# Patient Record
Sex: FEMALE | Race: WHITE | HISPANIC OR LATINO | Employment: FULL TIME | URBAN - METROPOLITAN AREA
[De-identification: names, ages, dates, MRNs, and addresses within clinical notes are randomized per-mention and may not be internally consistent; named-entity substitution may affect disease eponyms.]

---

## 2020-09-10 PROBLEM — M65.4 DE QUERVAIN'S TENOSYNOVITIS, LEFT: Status: ACTIVE | Noted: 2020-09-10

## 2020-09-10 PROBLEM — M65.311 TRIGGER THUMB OF RIGHT HAND: Status: ACTIVE | Noted: 2020-09-10

## 2020-11-03 ENCOUNTER — TELEPHONE (OUTPATIENT)
Dept: PAIN MEDICINE | Facility: CLINIC | Age: 60
End: 2020-11-03

## 2020-11-03 NOTE — TELEPHONE ENCOUNTER
----- Message from Kurtis Da Silva sent at 11/3/2020 11:07 AM CST -----  Regarding: VA referral  Contact: VA referral  VA referral/records and auth scanned into       CC: SCS battery swap and cervical rfa      Pt can be reached at [phone] (home)

## 2020-12-04 ENCOUNTER — HOSPITAL ENCOUNTER (OUTPATIENT)
Dept: RADIOLOGY | Facility: HOSPITAL | Age: 60
Discharge: HOME | End: 2020-12-04
Attending: ANESTHESIOLOGY
Payer: OTHER GOVERNMENT

## 2020-12-04 ENCOUNTER — HOSPITAL ENCOUNTER (OUTPATIENT)
Dept: RADIOLOGY | Facility: HOSPITAL | Age: 60
Discharge: HOME | End: 2020-12-04
Attending: ANESTHESIOLOGY
Payer: COMMERCIAL

## 2020-12-04 ENCOUNTER — OFFICE VISIT (OUTPATIENT)
Dept: PAIN MEDICINE | Facility: CLINIC | Age: 60
End: 2020-12-04
Payer: OTHER GOVERNMENT

## 2020-12-04 VITALS
WEIGHT: 179.13 LBS | HEART RATE: 80 BPM | HEIGHT: 66 IN | SYSTOLIC BLOOD PRESSURE: 148 MMHG | TEMPERATURE: 98 F | OXYGEN SATURATION: 97 % | DIASTOLIC BLOOD PRESSURE: 72 MMHG | RESPIRATION RATE: 18 BRPM | BODY MASS INDEX: 28.79 KG/M2

## 2020-12-04 DIAGNOSIS — M54.16 BILATERAL LUMBAR RADICULOPATHY: ICD-10-CM

## 2020-12-04 DIAGNOSIS — M50.30 DDD (DEGENERATIVE DISC DISEASE), CERVICAL: ICD-10-CM

## 2020-12-04 DIAGNOSIS — G89.4 CHRONIC PAIN DISORDER: Primary | ICD-10-CM

## 2020-12-04 DIAGNOSIS — M43.00 SPONDYLOLYSIS, SITE UNSPECIFIED: ICD-10-CM

## 2020-12-04 DIAGNOSIS — M47.816 LUMBAR SPONDYLOSIS: ICD-10-CM

## 2020-12-04 PROCEDURE — 99205 OFFICE O/P NEW HI 60 MIN: CPT | Mod: S$PBB,,, | Performed by: ANESTHESIOLOGY

## 2020-12-04 PROCEDURE — 99205 PR OFFICE/OUTPT VISIT, NEW, LEVL V, 60-74 MIN: ICD-10-PCS | Mod: S$PBB,,, | Performed by: ANESTHESIOLOGY

## 2020-12-04 PROCEDURE — 72080 XR THORACOLUMBAR SPINE AP LATERAL: ICD-10-PCS | Mod: 26,,, | Performed by: RADIOLOGY

## 2020-12-04 PROCEDURE — 72050 XR CERVICAL SPINE COMPLETE 5 VIEW: ICD-10-PCS | Mod: 26,,, | Performed by: RADIOLOGY

## 2020-12-04 PROCEDURE — 72080 X-RAY EXAM THORACOLMB 2/> VW: CPT | Mod: 26,,, | Performed by: RADIOLOGY

## 2020-12-04 PROCEDURE — 72050 X-RAY EXAM NECK SPINE 4/5VWS: CPT | Mod: TC,PO

## 2020-12-04 PROCEDURE — 72080 X-RAY EXAM THORACOLMB 2/> VW: CPT | Mod: TC,PO

## 2020-12-04 PROCEDURE — 99999 PR PBB SHADOW E&M-EST. PATIENT-LVL IV: CPT | Mod: PBBFAC,,, | Performed by: ANESTHESIOLOGY

## 2020-12-04 PROCEDURE — 99214 OFFICE O/P EST MOD 30 MIN: CPT | Mod: PBBFAC,25,PN | Performed by: ANESTHESIOLOGY

## 2020-12-04 PROCEDURE — 72050 X-RAY EXAM NECK SPINE 4/5VWS: CPT | Mod: 26,,, | Performed by: RADIOLOGY

## 2020-12-04 PROCEDURE — 99999 PR PBB SHADOW E&M-EST. PATIENT-LVL IV: ICD-10-PCS | Mod: PBBFAC,,, | Performed by: ANESTHESIOLOGY

## 2020-12-04 NOTE — PROGRESS NOTES
This note was completed with dictation software and grammatical errors may exist.    CC:Neck pain, back and leg pain    HPI:  The patient is a 60-year-old woman with a history of chronic cervical and lumbar DDD issues, status post cervical fusion and spinal cord stimulator for lumbar issues who presents in self-referral for continued neck and back pain.  The patient has been in the [employer] for many years and so has had her care through the [facility].  She reports that she was traveling to [place] for [facility] care and would rather stay on this side of the leg.  She reports having chronic neck issues, developed weakness and eventually underwent cervical fusion in 2008.  She did very well with this, resolved her neck and left arm pain but over the last 10 years she has had more neck pain radiating into left arm.  She has undergone cervical epidural steroid injections and radiofrequency ablation is a with some relief.  The last time this was done was about a year ago.  She feels that these provide significant benefit so that she can function well in her continued work.  Her other problem is chronic low back pain, had undergone numerous interventions for this, never had any lumbar surgery, was told that this would be too extensive.  She eventually underwent spinal cord stimulator implantation in 2015 with [employer].  She reports that this has helped tremendously in her bilateral low back and legs.  However, over the last several years she has not had as much relief and states that the [facility] had not reprogrammed the device but instead told her that they would change out the IPG.  She denies any weakness in the arms or legs, states that her pain is fairly constant.  It is worse with prolonged sitting or standing, worse in the morning, worse with doing any lifting.  She gets some relief with rest, heat and cold and medications.    Pain intervention history:  She has undergone cervical radiofrequency ablation,  unclear what level but states that she gets up to 85% relief for up to 9 months with this.  She has had a [employer] stimulator placed in 2015, it is not MRI compatible.    Antineuropathics:  Gabapentin 600 milligrams 3 times daily  NSAIDs:  Physical therapy:  Last physical therapy was in 2019, no benefit.  Walks for exercise  Antidepressants:  Cymbalta 60 milligrams  Muscle relaxers:  Opioids:  Tramadol 50 milligrams 3 times daily with mild relief, rarely takes Percocet half tablet 7.5/325  Antiplatelets/Anticoagulants:        ROS:  She reports easy bruising, difficulty sleeping, anxiety and depression.  Balance of review of systems is negative.    No results found for: LABA1C, HGBA1C    Lab Results   Component Value Date    WBC 9.58 08/20/2020    HGB 13.2 08/20/2020    HCT 38.2 08/20/2020    MCV 96 08/20/2020     (H) 08/20/2020             Past Medical History:   Diagnosis Date    Hypertension     Insomnia     Restless leg syndrome        Past Surgical History:   Procedure Laterality Date    ACDF  2008    SPINAL CORD STIMULATOR IMPLANT         Social History     Socioeconomic History    Marital status:      Spouse name: Not on file    Number of children: Not on file    Years of education: Not on file    Highest education level: Not on file   Occupational History    Not on file   Social Needs    Financial resource strain: Not on file    Food insecurity     Worry: Not on file     Inability: Not on file    Transportation needs     Medical: Not on file     Non-medical: Not on file   Tobacco Use    Smoking status: Never Smoker    Smokeless tobacco: Never Used   Substance and Sexual Activity    Alcohol use: Not Currently    Drug use: Not on file    Sexual activity: Not on file   Lifestyle    Physical activity     Days per week: Not on file     Minutes per session: Not on file    Stress: Not on file   Relationships    Social connections     Talks on phone: Not on file     Gets together:  "Not on file     Attends Protestant service: Not on file     Active member of club or organization: Not on file     Attends meetings of clubs or organizations: Not on file     Relationship status: Not on file   Other Topics Concern    Not on file   Social History Narrative    Not on file         Medications/Allergies: See med card    Vitals:    12/04/20 0810   BP: (!) 148/72   Pulse: 80   Resp: 18   Temp: 98.2 °F (36.8 °C)   TempSrc: Temporal   SpO2: 97%   Weight: 81.3 kg (179 lb 2 oz)   Height: 5' 6" (1.676 m)   PainSc:   6   PainLoc: Back         Physical exam:  Gen: A and O x3, pleasant, well-groomed  Skin: No rashes or obvious lesions  HEENT: PERRLA, no obvious deformities on ears or in canals.Trachea midline.  CVS: Regular rate and rhythm, normal palpable pulses.  Resp: Clear to auscultation bilaterally, no wheezes or rales.  Abdomen: Soft, NT/ND.  Musculoskeletal: Able to heel walk, toe walk. No antalgic gait.     Neuro:  Upper extremities: 5/5 strength bilaterally   Reflexes: Brachioradialis 1+, Bicep 1+, Tricep 1+.   Sensory: Intact and symmetrical to light touch and pinprick in C2-T1 dermatomes bilaterally.    Cervical Spine:  Cervical spine:  Range of motion is mildly reduced with both flexion and extension with slight increased pain in the left neck with extension, especially with lateral rotation to either side with increased pain with left lateral rotation but only mildly reduced range of motion to the right or left.  Spurling's maneuver causes left neck pain.  Myofascial exam:  Mild tenderness palpation over the left cervical paraspinous region.    Neuro:  Lower extremities: 5/5 strength bilaterally  Reflexes: Patellar 1+, Achilles 1+ bilaterally.  Sensory:  Intact and symmetrical to light touch and pinprick in L2-S1 dermatomes bilaterally.    Lumbar spine:  Lumbar spine: ROM is full with flexion extension and oblique extension with increased pain in the midline low back with extension especially with " right greater than left lateral rotation.    Meño's test causes low back pain, no buttock pain, no pain with palpation over the SI joints  Supine straight leg raise is negative bilaterally.    Internal and external rotation of the hip causes no increased pain on either side.  Myofascial exam: No tenderness to palpation across lumbar paraspinous muscles.    Imaging:  None    Assessment:   The patient is a 60-year-old woman with a history of chronic cervical and lumbar DDD issues, status post cervical fusion and spinal cord stimulator for lumbar issues who presents in self-referral for continued neck and back pain.   1. Chronic pain disorder     2. Spondylolysis, site unspecified  X-Ray Thoracolumbar Spine AP Lateral    X-Ray Cervical Spine Complete 5 view   3. DDD (degenerative disc disease), cervical  X-Ray Cervical Spine Complete 5 view   4. Lumbar spondylosis  X-Ray Thoracolumbar Spine AP Lateral   5. Bilateral lumbar radiculopathy         Plan:  1.  For her neck pain, she has had cervical RFA ease in the past with very good relief of neck pain and left arm pain.  I am going to get records from the [facility] to see what had been done, once we receive these we can schedule her for a repeat RFA.  We will also get records of all of her imaging.  2.  For her low back pain, I think that if she has some reprogramming performed, she may get significant enough benefit that she does not have to have her IPG replaced.  I have contacted the representative from [employer] to contact her.  I will also get x-rays of her lumbar spine, thoracic spine and cervical spine to evaluate hardware, placement of thoracic spinal cord stimulator leads.      Greater than 50% of this 60min visit was spent educating and counseling this patient.

## 2020-12-15 ENCOUNTER — TELEPHONE (OUTPATIENT)
Dept: PAIN MEDICINE | Facility: CLINIC | Age: 60
End: 2020-12-15

## 2020-12-15 NOTE — TELEPHONE ENCOUNTER
----- Message from [facility] sent at 12/15/2020 12:27 PM CST -----  Clare from the [facility] in [place] would like to send a disk to the office for pt please reach out to them on how to do at [phone]

## 2021-01-12 ENCOUNTER — TELEPHONE (OUTPATIENT)
Dept: PAIN MEDICINE | Facility: CLINIC | Age: 61
End: 2021-01-12

## 2021-01-21 ENCOUNTER — TELEPHONE (OUTPATIENT)
Dept: PAIN MEDICINE | Facility: CLINIC | Age: 61
End: 2021-01-21

## 2021-01-22 DIAGNOSIS — Z13.9 SCREENING PROCEDURE: ICD-10-CM

## 2021-01-22 DIAGNOSIS — M47.812 CERVICAL SPONDYLOSIS: Primary | ICD-10-CM

## 2021-02-03 ENCOUNTER — TELEPHONE (OUTPATIENT)
Dept: PAIN MEDICINE | Facility: CLINIC | Age: 61
End: 2021-02-03

## 2021-02-23 ENCOUNTER — TELEPHONE (OUTPATIENT)
Dept: PAIN MEDICINE | Facility: CLINIC | Age: 61
End: 2021-02-23

## 2021-03-08 ENCOUNTER — LAB VISIT (OUTPATIENT)
Dept: FAMILY MEDICINE | Facility: CLINIC | Age: 61
End: 2021-03-08
Payer: OTHER GOVERNMENT

## 2021-03-08 DIAGNOSIS — Z13.9 SCREENING PROCEDURE: ICD-10-CM

## 2021-03-08 PROCEDURE — U0005 INFEC AGEN DETEC AMPLI PROBE: HCPCS | Performed by: ANESTHESIOLOGY

## 2021-03-08 PROCEDURE — U0003 INFECTIOUS AGENT DETECTION BY NUCLEIC ACID (DNA OR RNA); SEVERE ACUTE RESPIRATORY SYNDROME CORONAVIRUS 2 (SARS-COV-2) (CORONAVIRUS DISEASE [COVID-19]), AMPLIFIED PROBE TECHNIQUE, MAKING USE OF HIGH THROUGHPUT TECHNOLOGIES AS DESCRIBED BY CMS-2020-01-R: HCPCS | Performed by: ANESTHESIOLOGY

## 2021-03-09 LAB — SARS-COV-2 RNA RESP QL NAA+PROBE: NOT DETECTED

## 2021-03-11 ENCOUNTER — HOSPITAL ENCOUNTER (OUTPATIENT)
Facility: HOSPITAL | Age: 61
Discharge: HOME | End: 2021-03-11
Attending: ANESTHESIOLOGY | Admitting: ANESTHESIOLOGY
Payer: OTHER GOVERNMENT

## 2021-03-11 ENCOUNTER — HOSPITAL ENCOUNTER (OUTPATIENT)
Dept: RADIOLOGY | Facility: HOSPITAL | Age: 61
Discharge: HOME | End: 2021-03-11
Attending: ANESTHESIOLOGY
Payer: OTHER GOVERNMENT

## 2021-03-11 DIAGNOSIS — G89.4 CHRONIC PAIN DISORDER: ICD-10-CM

## 2021-03-11 DIAGNOSIS — M47.812 CERVICAL SPONDYLOSIS: Primary | ICD-10-CM

## 2021-03-11 PROCEDURE — 25000003 PHARM REV CODE 250: Mod: PO | Performed by: ANESTHESIOLOGY

## 2021-03-11 PROCEDURE — 64634 DESTROY C/TH FACET JNT ADDL: CPT | Mod: LT,,, | Performed by: ANESTHESIOLOGY

## 2021-03-11 PROCEDURE — 64633 DESTROY CERV/THOR FACET JNT: CPT | Mod: PO | Performed by: ANESTHESIOLOGY

## 2021-03-11 PROCEDURE — 64634 PR DESTROY C/TH FACET JNT ADDL: ICD-10-PCS | Mod: LT,,, | Performed by: ANESTHESIOLOGY

## 2021-03-11 PROCEDURE — 76000 FLUOROSCOPY <1 HR PHYS/QHP: CPT | Mod: TC,PO

## 2021-03-11 PROCEDURE — 64633 PR DESTROY CERV/THOR FACET JNT: ICD-10-PCS | Mod: LT,,, | Performed by: ANESTHESIOLOGY

## 2021-03-11 PROCEDURE — 64634 DESTROY C/TH FACET JNT ADDL: CPT | Mod: PO | Performed by: ANESTHESIOLOGY

## 2021-03-11 PROCEDURE — 63600175 PHARM REV CODE 636 W HCPCS: Mod: PO | Performed by: ANESTHESIOLOGY

## 2021-03-11 PROCEDURE — 99152 MOD SED SAME PHYS/QHP 5/>YRS: CPT | Mod: ,,, | Performed by: ANESTHESIOLOGY

## 2021-03-11 PROCEDURE — 99152 PR MOD CONSCIOUS SEDATION, SAME PHYS, 5+ YRS, FIRST 15 MIN: ICD-10-PCS | Mod: ,,, | Performed by: ANESTHESIOLOGY

## 2021-03-11 PROCEDURE — A4216 STERILE WATER/SALINE, 10 ML: HCPCS | Mod: PO | Performed by: ANESTHESIOLOGY

## 2021-03-11 PROCEDURE — 64633 DESTROY CERV/THOR FACET JNT: CPT | Mod: LT,,, | Performed by: ANESTHESIOLOGY

## 2021-03-11 RX ORDER — MIDAZOLAM HYDROCHLORIDE 2 MG/2ML
INJECTION, SOLUTION INTRAMUSCULAR; INTRAVENOUS
Status: DISCONTINUED | OUTPATIENT
Start: 2021-03-11 | End: 2021-03-11 | Stop reason: HOSPADM

## 2021-03-11 RX ORDER — FENTANYL CITRATE 50 UG/ML
INJECTION, SOLUTION INTRAMUSCULAR; INTRAVENOUS
Status: DISCONTINUED | OUTPATIENT
Start: 2021-03-11 | End: 2021-03-11 | Stop reason: HOSPADM

## 2021-03-11 RX ORDER — LIDOCAINE HYDROCHLORIDE 10 MG/ML
INJECTION, SOLUTION EPIDURAL; INFILTRATION; INTRACAUDAL; PERINEURAL
Status: DISCONTINUED | OUTPATIENT
Start: 2021-03-11 | End: 2021-03-11 | Stop reason: HOSPADM

## 2021-03-11 RX ORDER — SODIUM CHLORIDE 9 MG/ML
INJECTION, SOLUTION INTRAMUSCULAR; INTRAVENOUS; SUBCUTANEOUS
Status: DISCONTINUED | OUTPATIENT
Start: 2021-03-11 | End: 2021-03-11 | Stop reason: HOSPADM

## 2021-03-11 RX ORDER — LIDOCAINE HYDROCHLORIDE 20 MG/ML
INJECTION, SOLUTION EPIDURAL; INFILTRATION; INTRACAUDAL; PERINEURAL
Status: DISCONTINUED | OUTPATIENT
Start: 2021-03-11 | End: 2021-03-11 | Stop reason: HOSPADM

## 2021-03-11 RX ORDER — SODIUM CHLORIDE, SODIUM LACTATE, POTASSIUM CHLORIDE, CALCIUM CHLORIDE 600; 310; 30; 20 MG/100ML; MG/100ML; MG/100ML; MG/100ML
INJECTION, SOLUTION INTRAVENOUS CONTINUOUS
Status: DISCONTINUED | OUTPATIENT
Start: 2021-03-11 | End: 2021-03-11 | Stop reason: HOSPADM

## 2021-03-11 RX ORDER — METHYLPREDNISOLONE ACETATE 40 MG/ML
INJECTION, SUSPENSION INTRA-ARTICULAR; INTRALESIONAL; INTRAMUSCULAR; SOFT TISSUE
Status: DISCONTINUED | OUTPATIENT
Start: 2021-03-11 | End: 2021-03-11 | Stop reason: HOSPADM

## 2021-03-11 RX ADMIN — SODIUM CHLORIDE, SODIUM LACTATE, POTASSIUM CHLORIDE, AND CALCIUM CHLORIDE: .6; .31; .03; .02 INJECTION, SOLUTION INTRAVENOUS at 01:03

## 2021-03-12 VITALS
HEART RATE: 85 BPM | TEMPERATURE: 98 F | HEIGHT: 66 IN | SYSTOLIC BLOOD PRESSURE: 148 MMHG | DIASTOLIC BLOOD PRESSURE: 75 MMHG | OXYGEN SATURATION: 98 % | BODY MASS INDEX: 28.12 KG/M2 | RESPIRATION RATE: 16 BRPM | WEIGHT: 175 LBS

## 2021-04-09 ENCOUNTER — PATIENT MESSAGE (OUTPATIENT)
Dept: PAIN MEDICINE | Facility: CLINIC | Age: 61
End: 2021-04-09

## 2021-04-14 ENCOUNTER — PATIENT MESSAGE (OUTPATIENT)
Dept: PAIN MEDICINE | Facility: CLINIC | Age: 61
End: 2021-04-14

## 2021-04-15 ENCOUNTER — OFFICE VISIT (OUTPATIENT)
Dept: PAIN MEDICINE | Facility: CLINIC | Age: 61
End: 2021-04-15
Payer: OTHER GOVERNMENT

## 2021-04-15 VITALS
RESPIRATION RATE: 18 BRPM | HEART RATE: 95 BPM | WEIGHT: 181.31 LBS | TEMPERATURE: 97 F | BODY MASS INDEX: 29.27 KG/M2 | DIASTOLIC BLOOD PRESSURE: 67 MMHG | SYSTOLIC BLOOD PRESSURE: 141 MMHG | OXYGEN SATURATION: 97 %

## 2021-04-15 DIAGNOSIS — M47.812 CERVICAL SPONDYLOSIS: ICD-10-CM

## 2021-04-15 DIAGNOSIS — Z01.818 PRE-OP TESTING: ICD-10-CM

## 2021-04-15 DIAGNOSIS — M47.816 LUMBAR SPONDYLOSIS: Primary | ICD-10-CM

## 2021-04-15 PROCEDURE — 99214 OFFICE O/P EST MOD 30 MIN: CPT | Mod: S$PBB,,, | Performed by: PHYSICIAN ASSISTANT

## 2021-04-15 PROCEDURE — 99215 OFFICE O/P EST HI 40 MIN: CPT | Mod: PBBFAC,PN | Performed by: PHYSICIAN ASSISTANT

## 2021-04-15 PROCEDURE — 99214 PR OFFICE/OUTPT VISIT, EST, LEVL IV, 30-39 MIN: ICD-10-PCS | Mod: S$PBB,,, | Performed by: PHYSICIAN ASSISTANT

## 2021-04-15 PROCEDURE — 99999 PR PBB SHADOW E&M-EST. PATIENT-LVL V: ICD-10-PCS | Mod: PBBFAC,,, | Performed by: PHYSICIAN ASSISTANT

## 2021-04-15 PROCEDURE — 99999 PR PBB SHADOW E&M-EST. PATIENT-LVL V: CPT | Mod: PBBFAC,,, | Performed by: PHYSICIAN ASSISTANT

## 2021-04-15 RX ORDER — SODIUM CHLORIDE, SODIUM LACTATE, POTASSIUM CHLORIDE, CALCIUM CHLORIDE 600; 310; 30; 20 MG/100ML; MG/100ML; MG/100ML; MG/100ML
INJECTION, SOLUTION INTRAVENOUS CONTINUOUS
Status: CANCELLED | OUTPATIENT
Start: 2021-04-29

## 2021-04-28 ENCOUNTER — TELEPHONE (OUTPATIENT)
Dept: PAIN MEDICINE | Facility: CLINIC | Age: 61
End: 2021-04-28

## 2021-04-29 ENCOUNTER — PATIENT MESSAGE (OUTPATIENT)
Dept: RESEARCH | Facility: HOSPITAL | Age: 61
End: 2021-04-29

## 2021-04-29 ENCOUNTER — HOSPITAL ENCOUNTER (OUTPATIENT)
Dept: RADIOLOGY | Facility: HOSPITAL | Age: 61
Discharge: HOME | End: 2021-04-29
Attending: ANESTHESIOLOGY
Payer: OTHER GOVERNMENT

## 2021-04-29 DIAGNOSIS — M51.36 DDD (DEGENERATIVE DISC DISEASE), LUMBAR: ICD-10-CM

## 2021-08-19 ENCOUNTER — OFFICE VISIT (OUTPATIENT)
Dept: GASTROENTEROLOGY | Facility: CLINIC | Age: 61
End: 2021-08-19
Payer: OTHER GOVERNMENT

## 2021-08-19 VITALS — WEIGHT: 192.88 LBS | RESPIRATION RATE: 19 BRPM | HEIGHT: 66 IN | BODY MASS INDEX: 31 KG/M2

## 2021-08-19 DIAGNOSIS — Z80.0 FAMILY HISTORY OF STOMACH CANCER: ICD-10-CM

## 2021-08-19 DIAGNOSIS — K92.1 HEMATOCHEZIA: ICD-10-CM

## 2021-08-19 DIAGNOSIS — R19.4 CHANGE IN BOWEL HABITS: Primary | ICD-10-CM

## 2021-08-19 DIAGNOSIS — R10.9 ABDOMINAL CRAMPING: ICD-10-CM

## 2021-08-19 DIAGNOSIS — R12 HEARTBURN: ICD-10-CM

## 2021-08-19 DIAGNOSIS — Z01.818 PREOP TESTING: ICD-10-CM

## 2021-08-19 DIAGNOSIS — R19.7 DIARRHEA, UNSPECIFIED TYPE: ICD-10-CM

## 2021-08-19 PROBLEM — M54.50 LOW BACK PAIN: Status: ACTIVE | Noted: 2021-08-19

## 2021-08-19 PROBLEM — B07.9 WART: Status: ACTIVE | Noted: 2021-08-19

## 2021-08-19 PROBLEM — G47.00 SLEEPLESSNESS: Status: ACTIVE | Noted: 2021-08-19

## 2021-08-19 PROBLEM — F33.0 MAJOR DEPRESSIVE DISORDER, RECURRENT, MILD: Status: ACTIVE | Noted: 2021-08-19

## 2021-08-19 PROBLEM — I10 BENIGN ESSENTIAL HYPERTENSION: Status: ACTIVE | Noted: 2021-08-19

## 2021-08-19 PROBLEM — G89.29 CHRONIC LOW BACK PAIN: Status: ACTIVE | Noted: 2021-08-19

## 2021-08-19 PROBLEM — Z72.0 TOBACCO USER: Status: ACTIVE | Noted: 2021-08-19

## 2021-08-19 PROBLEM — F41.1 GENERALIZED ANXIETY DISORDER: Status: ACTIVE | Noted: 2021-08-19

## 2021-08-19 PROBLEM — E23.7 DISORDER OF PITUITARY GLAND: Status: ACTIVE | Noted: 2021-08-19

## 2021-08-19 PROBLEM — M47.22 CERVICAL SPONDYLOSIS WITH RADICULOPATHY: Status: ACTIVE | Noted: 2021-08-19

## 2021-08-19 PROBLEM — M54.50 CHRONIC LOW BACK PAIN: Status: ACTIVE | Noted: 2021-08-19

## 2021-08-19 PROBLEM — M21.619 BUNION: Status: ACTIVE | Noted: 2021-08-19

## 2021-08-19 PROBLEM — G25.81 RESTLESS LEGS SYNDROME: Status: ACTIVE | Noted: 2021-08-19

## 2021-08-19 PROBLEM — J32.1 FRONTAL SINUSITIS: Status: ACTIVE | Noted: 2021-08-19

## 2021-08-19 PROCEDURE — 99214 OFFICE O/P EST MOD 30 MIN: CPT | Mod: PBBFAC,PO | Performed by: NURSE PRACTITIONER

## 2021-08-19 PROCEDURE — 99203 OFFICE O/P NEW LOW 30 MIN: CPT | Mod: S$PBB,,, | Performed by: NURSE PRACTITIONER

## 2021-08-19 PROCEDURE — 99999 PR PBB SHADOW E&M-EST. PATIENT-LVL IV: ICD-10-PCS | Mod: PBBFAC,,, | Performed by: NURSE PRACTITIONER

## 2021-08-19 PROCEDURE — 99203 PR OFFICE/OUTPT VISIT, NEW, LEVL III, 30-44 MIN: ICD-10-PCS | Mod: S$PBB,,, | Performed by: NURSE PRACTITIONER

## 2021-08-19 PROCEDURE — 99999 PR PBB SHADOW E&M-EST. PATIENT-LVL IV: CPT | Mod: PBBFAC,,, | Performed by: NURSE PRACTITIONER

## 2021-08-19 RX ORDER — OMEPRAZOLE 20 MG/1
20 CAPSULE, DELAYED RELEASE ORAL DAILY
COMMUNITY

## 2021-08-19 RX ORDER — CHOLECALCIFEROL (VITAMIN D3) 50 MCG
TABLET ORAL
COMMUNITY
Start: 2020-11-03

## 2021-08-19 RX ORDER — OXYCODONE AND ACETAMINOPHEN 7.5; 325 MG/1; MG/1
1 TABLET ORAL EVERY 4 HOURS PRN
COMMUNITY

## 2021-08-19 RX ORDER — POTASSIUM CHLORIDE 750 MG/1
TABLET, EXTENDED RELEASE ORAL
COMMUNITY
Start: 2020-11-03

## 2021-11-12 ENCOUNTER — HOSPITAL ENCOUNTER (OUTPATIENT)
Facility: HOSPITAL | Age: 61
Discharge: HOME | End: 2021-11-12
Attending: INTERNAL MEDICINE | Admitting: INTERNAL MEDICINE
Payer: OTHER GOVERNMENT

## 2021-11-12 ENCOUNTER — ANESTHESIA EVENT (OUTPATIENT)
Dept: ENDOSCOPY | Facility: HOSPITAL | Age: 61
End: 2021-11-12
Payer: OTHER GOVERNMENT

## 2021-11-12 ENCOUNTER — ANESTHESIA (OUTPATIENT)
Dept: ENDOSCOPY | Facility: HOSPITAL | Age: 61
End: 2021-11-12
Payer: OTHER GOVERNMENT

## 2021-11-12 DIAGNOSIS — R19.7 DIARRHEA: ICD-10-CM

## 2021-11-12 PROCEDURE — 88305 TISSUE EXAM BY PATHOLOGIST: CPT | Mod: 26,,, | Performed by: PATHOLOGY

## 2021-11-12 PROCEDURE — 00813 ANES UPR LWR GI NDSC PX: CPT | Mod: PO | Performed by: INTERNAL MEDICINE

## 2021-11-12 PROCEDURE — 45385 PR COLONOSCOPY,REMV LESN,SNARE: ICD-10-PCS | Mod: ,,, | Performed by: INTERNAL MEDICINE

## 2021-11-12 PROCEDURE — 27201012 HC FORCEPS, HOT/COLD, DISP: Mod: PO | Performed by: INTERNAL MEDICINE

## 2021-11-12 PROCEDURE — 43239 PR EGD, FLEX, W/BIOPSY, SGL/MULTI: ICD-10-PCS | Mod: 51,,, | Performed by: INTERNAL MEDICINE

## 2021-11-12 PROCEDURE — 25000003 PHARM REV CODE 250: Mod: PO | Performed by: NURSE ANESTHETIST, CERTIFIED REGISTERED

## 2021-11-12 PROCEDURE — 88305 TISSUE EXAM BY PATHOLOGIST: CPT | Mod: 59 | Performed by: PATHOLOGY

## 2021-11-12 PROCEDURE — 45385 COLONOSCOPY W/LESION REMOVAL: CPT | Mod: ,,, | Performed by: INTERNAL MEDICINE

## 2021-11-12 PROCEDURE — 88305 TISSUE EXAM BY PATHOLOGIST: ICD-10-PCS | Mod: 26,,, | Performed by: PATHOLOGY

## 2021-11-12 PROCEDURE — 43239 EGD BIOPSY SINGLE/MULTIPLE: CPT | Mod: 51,,, | Performed by: INTERNAL MEDICINE

## 2021-11-12 PROCEDURE — 45385 COLONOSCOPY W/LESION REMOVAL: CPT | Mod: PO | Performed by: INTERNAL MEDICINE

## 2021-11-12 PROCEDURE — 37000009 HC ANESTHESIA EA ADD 15 MINS: Mod: PO | Performed by: INTERNAL MEDICINE

## 2021-11-12 PROCEDURE — 45380 COLONOSCOPY AND BIOPSY: CPT | Mod: 59,,, | Performed by: INTERNAL MEDICINE

## 2021-11-12 PROCEDURE — 37000008 HC ANESTHESIA 1ST 15 MINUTES: Mod: PO | Performed by: INTERNAL MEDICINE

## 2021-11-12 PROCEDURE — 27201089 HC SNARE, DISP (ANY): Mod: PO | Performed by: INTERNAL MEDICINE

## 2021-11-12 PROCEDURE — 45380 PR COLONOSCOPY,BIOPSY: ICD-10-PCS | Mod: 59,,, | Performed by: INTERNAL MEDICINE

## 2021-11-12 PROCEDURE — D9220A PRA ANESTHESIA: ICD-10-PCS | Mod: ,,, | Performed by: ANESTHESIOLOGY

## 2021-11-12 PROCEDURE — 63600175 PHARM REV CODE 636 W HCPCS: Mod: PO | Performed by: NURSE ANESTHETIST, CERTIFIED REGISTERED

## 2021-11-12 PROCEDURE — 63600175 PHARM REV CODE 636 W HCPCS: Mod: PO | Performed by: INTERNAL MEDICINE

## 2021-11-12 PROCEDURE — D9220A PRA ANESTHESIA: Mod: ,,, | Performed by: ANESTHESIOLOGY

## 2021-11-12 PROCEDURE — 43239 EGD BIOPSY SINGLE/MULTIPLE: CPT | Mod: PO | Performed by: INTERNAL MEDICINE

## 2021-11-12 PROCEDURE — 45380 COLONOSCOPY AND BIOPSY: CPT | Mod: PO | Performed by: INTERNAL MEDICINE

## 2021-11-12 RX ORDER — PROPOFOL 10 MG/ML
VIAL (ML) INTRAVENOUS
Status: DISCONTINUED | OUTPATIENT
Start: 2021-11-12 | End: 2021-11-12

## 2021-11-12 RX ORDER — LIDOCAINE HCL/PF 100 MG/5ML
SYRINGE (ML) INTRAVENOUS
Status: DISCONTINUED | OUTPATIENT
Start: 2021-11-12 | End: 2021-11-12

## 2021-11-12 RX ORDER — SODIUM CHLORIDE, SODIUM LACTATE, POTASSIUM CHLORIDE, CALCIUM CHLORIDE 600; 310; 30; 20 MG/100ML; MG/100ML; MG/100ML; MG/100ML
INJECTION, SOLUTION INTRAVENOUS CONTINUOUS
Status: DISCONTINUED | OUTPATIENT
Start: 2021-11-12 | End: 2021-11-12 | Stop reason: HOSPADM

## 2021-11-12 RX ORDER — SODIUM CHLORIDE 0.9 % (FLUSH) 0.9 %
10 SYRINGE (ML) INJECTION
Status: DISCONTINUED | OUTPATIENT
Start: 2021-11-12 | End: 2021-11-12 | Stop reason: HOSPADM

## 2021-11-12 RX ADMIN — PROPOFOL 50 MG: 10 INJECTION, EMULSION INTRAVENOUS at 01:11

## 2021-11-12 RX ADMIN — LIDOCAINE HYDROCHLORIDE 100 MG: 20 INJECTION, SOLUTION INTRAVENOUS at 01:11

## 2021-11-12 RX ADMIN — SODIUM CHLORIDE, SODIUM LACTATE, POTASSIUM CHLORIDE, AND CALCIUM CHLORIDE: .6; .31; .03; .02 INJECTION, SOLUTION INTRAVENOUS at 12:11

## 2021-11-12 RX ADMIN — PROPOFOL 150 MG: 10 INJECTION, EMULSION INTRAVENOUS at 01:11

## 2021-11-15 VITALS
TEMPERATURE: 97 F | SYSTOLIC BLOOD PRESSURE: 135 MMHG | DIASTOLIC BLOOD PRESSURE: 65 MMHG | HEIGHT: 66 IN | RESPIRATION RATE: 17 BRPM | HEART RATE: 86 BPM | BODY MASS INDEX: 29.73 KG/M2 | WEIGHT: 185 LBS | OXYGEN SATURATION: 98 %

## 2021-11-19 LAB
FINAL PATHOLOGIC DIAGNOSIS: NORMAL
Lab: NORMAL

## 2022-08-04 ENCOUNTER — TELEPHONE (OUTPATIENT)
Dept: SMOKING CESSATION | Facility: CLINIC | Age: 62
End: 2022-08-04
Payer: OTHER GOVERNMENT

## 2022-08-09 ENCOUNTER — TELEPHONE (OUTPATIENT)
Dept: SMOKING CESSATION | Facility: CLINIC | Age: 62
End: 2022-08-09
Payer: OTHER GOVERNMENT

## 2022-08-15 ENCOUNTER — TELEPHONE (OUTPATIENT)
Dept: SMOKING CESSATION | Facility: CLINIC | Age: 62
End: 2022-08-15
Payer: OTHER GOVERNMENT

## 2022-08-15 NOTE — TELEPHONE ENCOUNTER
I called patient to reschedule her tobacco cessation appointment but no answer and could not leave a message.

## 2023-02-13 ENCOUNTER — OFFICE VISIT (OUTPATIENT)
Dept: PAIN MEDICINE | Facility: CLINIC | Age: 63
End: 2023-02-13
Payer: OTHER GOVERNMENT

## 2023-02-13 ENCOUNTER — TELEPHONE (OUTPATIENT)
Dept: PAIN MEDICINE | Facility: CLINIC | Age: 63
End: 2023-02-13
Payer: OTHER GOVERNMENT

## 2023-02-13 VITALS
HEART RATE: 92 BPM | BODY MASS INDEX: 30.72 KG/M2 | DIASTOLIC BLOOD PRESSURE: 66 MMHG | SYSTOLIC BLOOD PRESSURE: 127 MMHG | HEIGHT: 66 IN | WEIGHT: 191.13 LBS

## 2023-02-13 DIAGNOSIS — M47.816 LUMBAR SPONDYLOSIS: Primary | ICD-10-CM

## 2023-02-13 DIAGNOSIS — M47.812 CERVICAL SPONDYLOSIS: ICD-10-CM

## 2023-02-13 PROCEDURE — 99214 PR OFFICE/OUTPT VISIT, EST, LEVL IV, 30-39 MIN: ICD-10-PCS | Mod: S$PBB,,, | Performed by: PHYSICIAN ASSISTANT

## 2023-02-13 PROCEDURE — 99214 OFFICE O/P EST MOD 30 MIN: CPT | Mod: PBBFAC,PN | Performed by: PHYSICIAN ASSISTANT

## 2023-02-13 PROCEDURE — 99214 OFFICE O/P EST MOD 30 MIN: CPT | Mod: S$PBB,,, | Performed by: PHYSICIAN ASSISTANT

## 2023-02-13 PROCEDURE — 99999 PR PBB SHADOW E&M-EST. PATIENT-LVL IV: CPT | Mod: PBBFAC,,, | Performed by: PHYSICIAN ASSISTANT

## 2023-02-13 PROCEDURE — 99999 PR PBB SHADOW E&M-EST. PATIENT-LVL IV: ICD-10-PCS | Mod: PBBFAC,,, | Performed by: PHYSICIAN ASSISTANT

## 2023-02-13 RX ORDER — AMLODIPINE BESYLATE 10 MG/1
10 TABLET ORAL NIGHTLY
COMMUNITY
Start: 2023-01-23

## 2023-02-13 RX ORDER — TRAZODONE HYDROCHLORIDE 100 MG/1
100 TABLET ORAL NIGHTLY
COMMUNITY
Start: 2022-11-21

## 2023-02-13 RX ORDER — HYDROCHLOROTHIAZIDE 50 MG/1
25 TABLET ORAL DAILY
COMMUNITY
Start: 2022-07-28

## 2023-02-13 RX ORDER — SODIUM CHLORIDE, SODIUM LACTATE, POTASSIUM CHLORIDE, CALCIUM CHLORIDE 600; 310; 30; 20 MG/100ML; MG/100ML; MG/100ML; MG/100ML
INJECTION, SOLUTION INTRAVENOUS CONTINUOUS
Status: CANCELLED | OUTPATIENT
Start: 2023-02-13

## 2023-02-13 NOTE — TELEPHONE ENCOUNTER
Spoke with pt and scheduled for 03/03 pt would like last case if possible went over all instructions.

## 2023-02-13 NOTE — TELEPHONE ENCOUNTER
Follow-up: -> Other (specify in comments) Cmt: Phone call next day,                   requests procedures be last patient on Fridays.              Priority: Routine  Class: Normal     Future Order Information       Expires on:02/13/2024            Expected by:02/13/2023                    Associated Diagnoses       M47.816 Lumbar spondylosis       Procedure -> Medial Branch Block (Specify level and laterality) Cmt:                     Bilateral L4/5 and L5/S1, RNIV          Physician -> Marcos           Is patient on anti-coagulants? Cmt: N/A          Pre-op Diagnosis -> Lumbar spondylosis           Facility Name: -> [facility]           Follow-up: -> Other (specify in comments) Cmt: Phone call next day,                   requests procedures be last patient on Fridays.

## 2023-02-20 NOTE — PROGRESS NOTES
This note was completed with dictation software and grammatical errors may exist.    CC:Neck pain, back and leg pain    HPI:  The patient is a 62-year-old woman with a history of chronic cervical and lumbar DDD issues, status post cervical fusion and spinal cord stimulator for lumbar issues who presents in self-referral for continued neck and back pain.  She returns in follow-up today with low back pain greater than left neck pain.  Her back pain stays across her low back without radiation to her legs.  Pain is worse with housework and with standing.  She does have a spinal cord stimulator which gives her sufficient relief of her leg symptoms.  Other complaint is left-sided neck pain without radiation into her arm.  She states that she had very good relief with the radiofrequency ablation almost 2 years ago and this has lasted over 1 year.  She denies having weakness or numbness, denies bladder or bowel incontinence.  She also reports that her IPG is no longer working properly.    Prior HPI:  The patient has been in the [employer] for many years and so has had her care through the [facility].  She reports that she was traveling to [place] for [facility] care and would rather stay on this side of the leg.  She reports having chronic neck issues, developed weakness and eventually underwent cervical fusion in 2008.  She did very well with this, resolved her neck and left arm pain but over the last 10 years she has had more neck pain radiating into left arm.  She has undergone cervical epidural steroid injections and radiofrequency ablation is a with some relief.  The last time this was done was about a year ago.  She feels that these provide significant benefit so that she can function well in her continued work.  Her other problem is chronic low back pain, had undergone numerous interventions for this, never had any lumbar surgery, was told that this would be too extensive.  She eventually underwent spinal cord  stimulator implantation in 2015 with [employer].  She reports that this has helped tremendously in her bilateral low back and legs.  However, over the last several years she has not had as much relief and states that the [facility] had not reprogrammed the device but instead told her that they would change out the IPG.  She denies any weakness in the arms or legs, states that her pain is fairly constant.  It is worse with prolonged sitting or standing, worse in the morning, worse with doing any lifting.  She gets some relief with rest, heat and cold and medications.    Pain intervention history:  She has undergone cervical radiofrequency ablation, unclear what level but states that she gets up to 85% relief for up to 9 months with this.  She has had a [employer] stimulator placed in 2015, it is not MRI compatible.  She is status post left C3, 4, 5, 6 medial branch radiofrequency ablation on 03/11/2021 with 70% relief.     Antineuropathics:  Gabapentin 600 milligrams 3 times daily  NSAIDs:  Physical therapy:  Last physical therapy was in 2019, no benefit.  Walks for exercise  Antidepressants:  Cymbalta 60 milligrams  Muscle relaxers:  Opioids:  Tramadol 50 milligrams 3 times daily with mild relief, rarely takes Percocet half tablet 7.5/325  Antiplatelets/Anticoagulants:    ROS:  She reports easy bruising, difficulty sleeping, anxiety and depression.  Balance of review of systems is negative.    No results found for: LABA1C, HGBA1C    Lab Results   Component Value Date    WBC 9.58 08/20/2020    HGB 13.2 08/20/2020    HCT 38.2 08/20/2020    MCV 96 08/20/2020     (H) 08/20/2020       Past Medical History:   Diagnosis Date    Arthritis     Hypertension     Insomnia     PONV (postoperative nausea and vomiting)     Restless leg syndrome        Past Surgical History:   Procedure Laterality Date    ACDF  2008    APPENDECTOMY      COLONOSCOPY  2011    normal findings per patient report    COLONOSCOPY N/A 11/12/2021     "Procedure: COLONOSCOPY;  Surgeon: Valentin Espinoza MD;  Location: [facility] ENDO;  Service: Endoscopy;  Laterality: N/A;    ESOPHAGOGASTRODUODENOSCOPY N/A 11/12/2021    Procedure: EGD (ESOPHAGOGASTRODUODENOSCOPY);  Surgeon: Valentin Espinoza MD;  Location: [facility] ENDO;  Service: Endoscopy;  Laterality: N/A;    HYSTERECTOMY      RADIOFREQUENCY THERMAL COAGULATION OF MEDIAL BRANCH OF POSTERIOR RAMUS OF CERVICAL SPINAL NERVE Left 3/11/2021    Procedure: RADIOFREQUENCY THERMAL COAGULATION, NERVE, SPINAL, CERVICAL C3,4,5,6;  Surgeon: Gavino Rodríguez MD;  Location: [facility] OR;  Service: Pain Management;  Laterality: Left;    SPINAL CORD STIMULATOR IMPLANT      UPPER GASTROINTESTINAL ENDOSCOPY  2011    normal findings per patient report, negative for h pylori       Social History     Socioeconomic History    Marital status:    Tobacco Use    Smoking status: Every Day     Packs/day: 1.00     Years: 20.00     Pack years: 20.00     Types: Cigarettes    Smokeless tobacco: Never   Substance and Sexual Activity    Alcohol use: Yes     Alcohol/week: 4.0 - 5.0 standard drinks     Types: 3 - 4 Cans of beer, 1 Shots of liquor per week     Comment: over a week    Drug use: Yes     Types: Other-see comments, Oxycodone     Comment: ultram         Medications/Allergies: See med card    Vitals:    02/13/23 0749   BP: 127/66   Pulse: 92   Weight: 86.7 kg (191 lb 2.2 oz)   Height: 5' 6" (1.676 m)   PainSc:   9   PainLoc: Back         Physical exam:  Gen: A and O x3, pleasant, well-groomed  Skin: No rashes or obvious lesions  HEENT: PERRLA, no obvious deformities on ears or in canals.Trachea midline.  CVS: Regular rate and rhythm, normal palpable pulses.  Resp: Clear to auscultation bilaterally, no wheezes or rales.  Abdomen: Soft, NT/ND.  Musculoskeletal: Able to heel walk, toe walk. No antalgic gait.     Neuro:  Upper extremities: 5/5 strength bilaterally   Lower extremities: 5/5 strength bilaterally  Reflexes: Brachioradialis 1+, " Bicep 1+, Tricep 1+.  Patellar 1+, Achilles 1+ bilaterally.  Sensory: Intact and symmetrical to light touch and pinprick in C2-T1 dermatomes bilaterally.  Intact and symmetrical to light touch and pinprick in L2-S1 dermatomes bilaterally.    Cervical Spine:  Cervical spine:  Range of motion is mildly reduced with flexion, extension and lateral rotation with increased left neck pain during left lateral rotation and extension.  Spurling's maneuver causes left neck pain.  Myofascial exam:  Mild tenderness palpation over the left cervical paraspinous region.    Lumbar spine:  Lumbar spine: ROM is full with flexion extension and oblique extension with increased pain in the midline low back with extension especially with right greater than left lateral rotation.    Meño's test causes low back pain, no buttock pain, no pain with palpation over the SI joints  Supine straight leg raise is negative bilaterally.    Internal and external rotation of the hip causes no increased pain on either side.  Myofascial exam: No tenderness to palpation across lumbar paraspinous muscles.    Imagin2020 x-ray cervical spine  There are postsurgical changes of ACDF of C5 through C7.  Hardware is intact without fracture or obvious loosening.  There is solid bony fusion of C5 through C7.  There is marked disc space narrowing at the C4-5 and C7-T1 levels where there is endplate osteophyte formation.  The odontoid process is intact.  The facet joints maintain normal articulation.  There is mild anterior osteophyte formation also present at the C3-4 level.  There is bilateral multilevel bony foraminal stenosis due to changes of uncovertebral spurring and/or facet joint arthropathy.  This is worst at the C3-4 level bilaterally but also present at the C4-5 level on the left.  The prevertebral soft tissues are normal.  There is mild atherosclerosis of the right carotid bulb.  The included lung apices are clear.    2020 x-ray  thoracolumbar spine  A neurostimulator appears to be present with 2 leads with the tips at approximately the T7-T8 level.  Osseous demineralization is noted diffusely.  A calcification appears to be present overlying the lower right renal shadow of 9 mm suggestive a stone.  A mild dextrocurvature is noted in the lumbar spine.  Intervertebral disc height loss noted at the L3-L4 and L5-S1 levels.  Facet arthropathy is noted at the L3-L4, L4-L5, and L5-S1 levels.  A minimal anterolisthesis is noted of the L4 on L5 vertebral body of 5 mm.    Assessment:   The patient is a 62-year-old woman with a history of chronic cervical and lumbar DDD issues, status post cervical fusion and spinal cord stimulator for lumbar issues who presents in self-referral for continued neck and back pain.   1. Lumbar spondylosis  Procedure Order to Pain Management      2. Cervical spondylosis            Plan:  1. I am going to schedule patient for bilateral L4/5 and L5/S1 diagnostic medial branch nerve blocks for facet mediated low back pain.  If successful we will repeat the blocks prior to proceeding with radiofrequency ablation.  2. We discussed repeating left C3, 4, 5, 6 medial branch radiofrequency ablation in the future.    3. Her spinal cord stimulator battery is not charging properly anymore and we discussed an IPG exchange with [employer] in the future.    4. Follow-up in 4 weeks postprocedure or sooner as needed.

## 2023-03-01 ENCOUNTER — TELEPHONE (OUTPATIENT)
Dept: SURGERY | Facility: HOSPITAL | Age: 63
End: 2023-03-01
Payer: OTHER GOVERNMENT

## 2023-03-01 NOTE — TELEPHONE ENCOUNTER
Pt is currently on amoxicillin for UTI. Procedure is scheduled for Friday, 3/3. Last dose of antibiotics will be Sunday, 3/5. Please reach out to patient if she needs to reschedule. Thank you.

## 2023-03-14 ENCOUNTER — TELEPHONE (OUTPATIENT)
Dept: SURGERY | Facility: HOSPITAL | Age: 63
End: 2023-03-14
Payer: OTHER GOVERNMENT

## 2023-03-14 NOTE — TELEPHONE ENCOUNTER
Good afternoon!    Ms. Nathan is not able to find work coverage for her lumbar MBB with Dr. Rodríguez on Friday, 3/17. She would like to reschedule her procedure. Please contact her to reschedule.       Thank you!

## 2023-03-15 NOTE — TELEPHONE ENCOUNTER
Spoke with patient and rescheduled her Lumbar MBB to 3/31. Patient would like last case of day if possible.     Nadira: can you replace orders, looks like they were canceled. Thank you.

## 2023-03-16 ENCOUNTER — TELEPHONE (OUTPATIENT)
Dept: PAIN MEDICINE | Facility: CLINIC | Age: 63
End: 2023-03-16
Payer: OTHER GOVERNMENT

## 2023-03-16 NOTE — TELEPHONE ENCOUNTER
----- Message from Sukhwinder Leonard sent at 3/14/2023  9:33 AM CDT -----  Contact: self  Type: Needs Medical Advice  Who Called:  Patient    Best Call Back Number: [phone]    Additional Information: Pt states she need to josé luis procedure on 3/17 her coverage is out sick. Please call back

## 2023-03-16 NOTE — TELEPHONE ENCOUNTER
Pt previously rescheduled. No assistance needed. Advised I would send block questions via [facility].

## 2023-03-31 ENCOUNTER — HOSPITAL ENCOUNTER (OUTPATIENT)
Dept: RADIOLOGY | Facility: HOSPITAL | Age: 63
Discharge: HOME | End: 2023-03-31
Attending: ANESTHESIOLOGY | Admitting: ANESTHESIOLOGY
Payer: OTHER GOVERNMENT

## 2023-03-31 ENCOUNTER — HOSPITAL ENCOUNTER (OUTPATIENT)
Facility: HOSPITAL | Age: 63
Discharge: HOME | End: 2023-03-31
Attending: ANESTHESIOLOGY | Admitting: ANESTHESIOLOGY
Payer: OTHER GOVERNMENT

## 2023-03-31 VITALS
OXYGEN SATURATION: 95 % | DIASTOLIC BLOOD PRESSURE: 69 MMHG | HEART RATE: 81 BPM | HEIGHT: 66 IN | BODY MASS INDEX: 30.7 KG/M2 | WEIGHT: 191 LBS | SYSTOLIC BLOOD PRESSURE: 124 MMHG | TEMPERATURE: 98 F | RESPIRATION RATE: 16 BRPM

## 2023-03-31 DIAGNOSIS — M54.50 LOWER BACK PAIN: ICD-10-CM

## 2023-03-31 DIAGNOSIS — M47.816 LUMBAR SPONDYLOSIS: ICD-10-CM

## 2023-03-31 PROCEDURE — 64494 INJ PARAVERT F JNT L/S 2 LEV: CPT | Mod: 50,,, | Performed by: ANESTHESIOLOGY

## 2023-03-31 PROCEDURE — 64494 PR INJ DX/THER AGNT PARAVERT FACET JOINT,IMG GUIDE,LUMBAR/SAC, 2ND LEVEL: ICD-10-PCS | Mod: 50,,, | Performed by: ANESTHESIOLOGY

## 2023-03-31 PROCEDURE — 25000003 PHARM REV CODE 250: Mod: PO | Performed by: ANESTHESIOLOGY

## 2023-03-31 PROCEDURE — 64494 INJ PARAVERT F JNT L/S 2 LEV: CPT | Mod: 50,PO | Performed by: ANESTHESIOLOGY

## 2023-03-31 PROCEDURE — 64493 INJ PARAVERT F JNT L/S 1 LEV: CPT | Mod: 50,PO | Performed by: ANESTHESIOLOGY

## 2023-03-31 PROCEDURE — 64493 PR INJ DX/THER AGNT PARAVERT FACET JOINT,IMG GUIDE,LUMBAR/SAC,1ST LVL: ICD-10-PCS | Mod: 50,,, | Performed by: ANESTHESIOLOGY

## 2023-03-31 PROCEDURE — 64493 INJ PARAVERT F JNT L/S 1 LEV: CPT | Mod: 50,,, | Performed by: ANESTHESIOLOGY

## 2023-03-31 PROCEDURE — 76000 FLUOROSCOPY <1 HR PHYS/QHP: CPT | Mod: TC,PO

## 2023-03-31 PROCEDURE — 63600175 PHARM REV CODE 636 W HCPCS: Mod: PO | Performed by: ANESTHESIOLOGY

## 2023-03-31 RX ORDER — SODIUM CHLORIDE, SODIUM LACTATE, POTASSIUM CHLORIDE, CALCIUM CHLORIDE 600; 310; 30; 20 MG/100ML; MG/100ML; MG/100ML; MG/100ML
INJECTION, SOLUTION INTRAVENOUS CONTINUOUS
Status: DISCONTINUED | OUTPATIENT
Start: 2023-03-31 | End: 2023-03-31 | Stop reason: HOSPADM

## 2023-03-31 RX ORDER — MIDAZOLAM HYDROCHLORIDE 1 MG/ML
INJECTION INTRAMUSCULAR; INTRAVENOUS
Status: DISCONTINUED | OUTPATIENT
Start: 2023-03-31 | End: 2023-03-31 | Stop reason: HOSPADM

## 2023-03-31 RX ORDER — BUPIVACAINE HYDROCHLORIDE 2.5 MG/ML
INJECTION, SOLUTION EPIDURAL; INFILTRATION; INTRACAUDAL
Status: DISCONTINUED | OUTPATIENT
Start: 2023-03-31 | End: 2023-03-31 | Stop reason: HOSPADM

## 2023-03-31 RX ORDER — LIDOCAINE HYDROCHLORIDE 10 MG/ML
INJECTION INFILTRATION; PERINEURAL
Status: DISCONTINUED | OUTPATIENT
Start: 2023-03-31 | End: 2023-03-31 | Stop reason: HOSPADM

## 2023-03-31 RX ADMIN — SODIUM CHLORIDE, POTASSIUM CHLORIDE, SODIUM LACTATE AND CALCIUM CHLORIDE: 600; 310; 30; 20 INJECTION, SOLUTION INTRAVENOUS at 02:03

## 2023-03-31 NOTE — DISCHARGE SUMMARY
[facility] - [facility]  Discharge Note  Short Stay    Procedure(s) (LRB):  Block-nerve-medial branch-lumbar L4/5 AND L5/S1 (Bilateral)      OUTCOME: Patient tolerated treatment/procedure well without complication and is now ready for discharge.    DISPOSITION: Home or Self Care    FINAL DIAGNOSIS:  Cervical spondylosis    FOLLOWUP: In clinic    DISCHARGE INSTRUCTIONS:    Discharge Procedure Orders   Diet Adult Regular     No dressing needed     Notify your health care provider if you experience any of the following:  temperature >100.4     Activity as tolerated

## 2023-03-31 NOTE — H&P
CC: Back pain    HPI: The patient is a 61yo woman with a history of lumbar spondylosis here for bilateral L4/5 and L5/S1 medial branch block. There are no major changes in history and physical from 2/13/23.    Past Medical History:   Diagnosis Date    Arthritis     Hypertension     Insomnia     PONV (postoperative nausea and vomiting)     Restless leg syndrome        Past Surgical History:   Procedure Laterality Date    ACDF  2008    APPENDECTOMY      COLONOSCOPY  2011    normal findings per patient report    COLONOSCOPY N/A 11/12/2021    Procedure: COLONOSCOPY;  Surgeon: Valentin Espinoza MD;  Location: [facility] ENDO;  Service: Endoscopy;  Laterality: N/A;    ESOPHAGOGASTRODUODENOSCOPY N/A 11/12/2021    Procedure: EGD (ESOPHAGOGASTRODUODENOSCOPY);  Surgeon: Valentin Espinoza MD;  Location: [facility] ENDO;  Service: Endoscopy;  Laterality: N/A;    HYSTERECTOMY      RADIOFREQUENCY THERMAL COAGULATION OF MEDIAL BRANCH OF POSTERIOR RAMUS OF CERVICAL SPINAL NERVE Left 3/11/2021    Procedure: RADIOFREQUENCY THERMAL COAGULATION, NERVE, SPINAL, CERVICAL C3,4,5,6;  Surgeon: Gavino Rodríguez MD;  Location: [facility] OR;  Service: Pain Management;  Laterality: Left;    SPINAL CORD STIMULATOR IMPLANT      UPPER GASTROINTESTINAL ENDOSCOPY  2011    normal findings per patient report, negative for h pylori       Family History   Problem Relation Age of Onset    Stomach cancer Father         h pylori associated    Colon cancer Neg Hx     Crohn's disease Neg Hx     Ulcerative colitis Neg Hx     Celiac disease Neg Hx        Social History     Socioeconomic History    Marital status:    Tobacco Use    Smoking status: Every Day     Packs/day: 1.00     Years: 20.00     Pack years: 20.00     Types: Cigarettes    Smokeless tobacco: Never   Substance and Sexual Activity    Alcohol use: Yes     Alcohol/week: 4.0 - 5.0 standard drinks     Types: 3 - 4 Cans of beer, 1 Shots of liquor per week     Comment: over a week    Drug use: Not  "Currently     Types: Other-see comments, Oxycodone     Comment: ultram       Current Facility-Administered Medications   Medication Dose Route Frequency Provider Last Rate Last Admin    lactated ringers infusion   Intravenous Continuous Gavino Rodríguez MD           Review of patient's allergies indicates:  No Known Allergies    Vitals:    03/01/23 1324 03/28/23 1522 03/31/23 1453   BP:   132/65   Pulse:   93   Resp:   16   Temp:   98.2 °F (36.8 °C)   SpO2:   98%   Weight: 86.6 kg (191 lb) 86.6 kg (191 lb)    Height: 5' 6" (1.676 m) 5' 6" (1.676 m)        ASA 2, Mallampati 2    REVIEW OF SYSTEMS:     GENERAL: No weight loss, malaise or fevers.  HEENT:  No recent changes in vision or hearing  NECK: Negative for lumps, no difficulty with swallowing.  RESPIRATORY: Negative for cough, wheezing or shortness of breath, patient denies any recent URI.  CARDIOVASCULAR: Negative for chest pain, leg swelling or palpitations.  GI: Negative for abdominal discomfort, blood in stools or black stools or change in bowel habits.  MUSCULOSKELETAL: See HPI.  SKIN: Negative for lesions, rash, and itching.  PSYCH: No suicidal or homicidal ideations, no current mood disturbances.  HEMATOLOGY/LYMPHOLOGY: Negative for prolonged bleeding, bruising easily or swollen nodes. Patient is not currently taking any anti-coagulants  ENDO: No history of diabetes or thyroid dysfunction  NEURO: No history of syncope, paralysis, seizures or tremors.All other reviewed and negative other than HPI.    Physical exam:  Gen: A and O x3, pleasant, well-groomed  Skin: No rashes or obvious lesions  HEENT: PERRLA, no obvious deformities on ears or in canals. No thyroid masses, trachea midline, no palpable lymph nodes in neck, axilla.  CVS: Regular rate and rhythm, normal S1 and S2, no murmurs.  Resp: Clear to auscultation bilaterally.  Abdomen: Soft, NT/ND, normal bowel sounds present.  Musculoskeletal/Neuro: Moving all extremities    Assessment:  Lumbar " spondylosis  -     Case Request Operating Room: Block-nerve-medial branch-lumbar  -     Place in Outpatient; Standing  -     Vital signs; Standing  -     Place 18-22 gauage peripheral IV ; Standing  -     Verify informed consent; Standing  -     Notify physician ; Standing  -     Notify physician ; Standing  -     Notify physician (specify); Standing  -     Diet NPO; Standing  -     Place CHAVA hose; Standing  -     lactated ringers infusion    Other orders  -     IP VTE LOW RISK PATIENT; Standing

## 2023-03-31 NOTE — OP NOTE
PROCEDURE DATE: 3/31/2023    PROCEDURE:  Diagnostic bilateral L4/5 and L5/S1 medial branch nerve block     DIAGNOSIS:  Lumbar spondylosis    Post Op diagnosis: Same    PHYSICIAN: Gavino Rodríguez MD    MEDICATIONS INJECTED: 0.25% bupivicaine, 1ml at each level    LOCAL ANESTHETIC USED: Lidocaine 1%, 2ml at each level    SEDATION MEDICATIONS:2mg versed    ESTIMATED BLOOD LOSS:  none    COMPLICATIONS:  none    TECHNIQUE: A time out was taken to identify the patient, procedure and side of the procedure. The patient was placed in a prone position, then prepped and draped in the usual sterile fashion using ChloraPrep and sterile towels.  The levels were determined under fluoroscopic guidance and then marked.  Local anesthetic was given by raising a wheal at the skin over each site and then infiltrated approximately 2cm deeper.  A 25-gauge 3.5 inch needle was introduced to the anatomic location of the right and then left L4/5 and L5/S1 medial branch nerves on the bilateral side.  The above medication was then injected. The patient tolerated the procedure well.     The patient was monitored after the procedure. The patient will be contacted in the next few days to determine extent of relief.  Patient was given post procedure and discharge instructions to follow at home.  The patient was discharged in a stable condition.

## 2023-04-07 ENCOUNTER — PATIENT MESSAGE (OUTPATIENT)
Dept: PAIN MEDICINE | Facility: CLINIC | Age: 63
End: 2023-04-07
Payer: OTHER GOVERNMENT

## 2023-04-17 DIAGNOSIS — M47.816 LUMBAR SPONDYLOSIS: Primary | ICD-10-CM

## 2023-04-17 RX ORDER — SODIUM CHLORIDE, SODIUM LACTATE, POTASSIUM CHLORIDE, CALCIUM CHLORIDE 600; 310; 30; 20 MG/100ML; MG/100ML; MG/100ML; MG/100ML
INJECTION, SOLUTION INTRAVENOUS CONTINUOUS
Status: CANCELLED | OUTPATIENT
Start: 2023-04-17

## 2023-05-05 ENCOUNTER — HOSPITAL ENCOUNTER (OUTPATIENT)
Dept: RADIOLOGY | Facility: HOSPITAL | Age: 63
Discharge: HOME | End: 2023-05-05
Attending: ANESTHESIOLOGY | Admitting: ANESTHESIOLOGY
Payer: OTHER GOVERNMENT

## 2023-05-05 ENCOUNTER — HOSPITAL ENCOUNTER (OUTPATIENT)
Facility: HOSPITAL | Age: 63
Discharge: HOME | End: 2023-05-05
Attending: ANESTHESIOLOGY | Admitting: ANESTHESIOLOGY
Payer: OTHER GOVERNMENT

## 2023-05-05 VITALS
HEIGHT: 66 IN | OXYGEN SATURATION: 97 % | DIASTOLIC BLOOD PRESSURE: 70 MMHG | SYSTOLIC BLOOD PRESSURE: 150 MMHG | HEART RATE: 74 BPM | BODY MASS INDEX: 30.7 KG/M2 | RESPIRATION RATE: 16 BRPM | TEMPERATURE: 98 F | WEIGHT: 191 LBS

## 2023-05-05 DIAGNOSIS — M47.816 LUMBAR SPONDYLOSIS: ICD-10-CM

## 2023-05-05 DIAGNOSIS — M54.50 LOWER BACK PAIN: ICD-10-CM

## 2023-05-05 PROCEDURE — 64494 INJ PARAVERT F JNT L/S 2 LEV: CPT | Mod: 50,,, | Performed by: ANESTHESIOLOGY

## 2023-05-05 PROCEDURE — 76000 FLUOROSCOPY <1 HR PHYS/QHP: CPT | Mod: TC,PO

## 2023-05-05 PROCEDURE — 64493 PR INJ DX/THER AGNT PARAVERT FACET JOINT,IMG GUIDE,LUMBAR/SAC,1ST LVL: ICD-10-PCS | Mod: 50,,, | Performed by: ANESTHESIOLOGY

## 2023-05-05 PROCEDURE — 64493 INJ PARAVERT F JNT L/S 1 LEV: CPT | Mod: 50,,, | Performed by: ANESTHESIOLOGY

## 2023-05-05 PROCEDURE — 25000003 PHARM REV CODE 250: Mod: PO | Performed by: ANESTHESIOLOGY

## 2023-05-05 PROCEDURE — 64494 INJ PARAVERT F JNT L/S 2 LEV: CPT | Mod: 50,PO | Performed by: ANESTHESIOLOGY

## 2023-05-05 PROCEDURE — 63600175 PHARM REV CODE 636 W HCPCS: Mod: PO | Performed by: ANESTHESIOLOGY

## 2023-05-05 PROCEDURE — 64493 INJ PARAVERT F JNT L/S 1 LEV: CPT | Mod: 50,PO | Performed by: ANESTHESIOLOGY

## 2023-05-05 PROCEDURE — 64494 PR INJ DX/THER AGNT PARAVERT FACET JOINT,IMG GUIDE,LUMBAR/SAC, 2ND LEVEL: ICD-10-PCS | Mod: 50,,, | Performed by: ANESTHESIOLOGY

## 2023-05-05 RX ORDER — LIDOCAINE HYDROCHLORIDE 10 MG/ML
INJECTION, SOLUTION EPIDURAL; INFILTRATION; INTRACAUDAL; PERINEURAL
Status: DISCONTINUED | OUTPATIENT
Start: 2023-05-05 | End: 2023-05-05 | Stop reason: HOSPADM

## 2023-05-05 RX ORDER — MIDAZOLAM HYDROCHLORIDE 2 MG/2ML
INJECTION, SOLUTION INTRAMUSCULAR; INTRAVENOUS
Status: DISCONTINUED | OUTPATIENT
Start: 2023-05-05 | End: 2023-05-05 | Stop reason: HOSPADM

## 2023-05-05 RX ORDER — SODIUM CHLORIDE, SODIUM LACTATE, POTASSIUM CHLORIDE, CALCIUM CHLORIDE 600; 310; 30; 20 MG/100ML; MG/100ML; MG/100ML; MG/100ML
INJECTION, SOLUTION INTRAVENOUS CONTINUOUS
Status: DISCONTINUED | OUTPATIENT
Start: 2023-05-05 | End: 2023-05-05 | Stop reason: HOSPADM

## 2023-05-05 RX ORDER — BUPIVACAINE HYDROCHLORIDE 2.5 MG/ML
INJECTION, SOLUTION EPIDURAL; INFILTRATION; INTRACAUDAL
Status: DISCONTINUED | OUTPATIENT
Start: 2023-05-05 | End: 2023-05-05 | Stop reason: HOSPADM

## 2023-05-05 RX ADMIN — SODIUM CHLORIDE, POTASSIUM CHLORIDE, SODIUM LACTATE AND CALCIUM CHLORIDE: 600; 310; 30; 20 INJECTION, SOLUTION INTRAVENOUS at 03:05

## 2023-05-05 NOTE — OP NOTE
PROCEDURE DATE: 5/5/2023    PROCEDURE:  Diagnostic bilateral L4/5 and L5/S1 medial branch nerve block     DIAGNOSIS:  Lumbar spondylosis    Post Op diagnosis: Same    PHYSICIAN: Gavino Rodríguez MD    MEDICATIONS INJECTED: 0.25% bupivicaine, 1ml at each level    LOCAL ANESTHETIC USED: Lidocaine 1%, 2ml at each level    SEDATION MEDICATIONS: 2mg versed    ESTIMATED BLOOD LOSS:  none    COMPLICATIONS:  none    TECHNIQUE: A time out was taken to identify the patient, procedure and side of the procedure. The patient was placed in a prone position, then prepped and draped in the usual sterile fashion using ChloraPrep and sterile towels.  The levels were determined under fluoroscopic guidance and then marked.  Local anesthetic was given by raising a wheal at the skin over each site and then infiltrated approximately 2cm deeper.  A 25-gauge 3.5 inch needle was introduced to the anatomic location of the right and then left L4/5 and L5/S1 medial branch nerves on the bilateral side.  The above medication was then injected. The patient tolerated the procedure well.     The patient was monitored after the procedure. The patient will be contacted in the next few days to determine extent of relief.  Patient was given post procedure and discharge instructions to follow at home.  The patient was discharged in a stable condition.     Presents to wound care for follow up wound assessment left arm/hand.  Patient has small scratches on left thumb and wrist, stating they just happened while he was sitting and waiting for appointment.  Patient states that his skin \"just pops open\".  After discussion, patient feels his new wound on his thumb was from \"cuting up aniyah\" today.  Patient denies discomfort or any ill feeling this visit. Dr. Rosenthal assessed, with new therapy plan created and carried out.  Sent written with patient for nursing staff at facility.

## 2023-05-05 NOTE — H&P
CC: Back pain    HPI: The patient is a 63yo with a history of lumbar spondylosis here for bilateral L4/5 and L5/S1 medial branch block. There are no major changes in history and physical from 2/13/23.    Past Medical History:   Diagnosis Date    Arthritis     Hypertension     Insomnia     PONV (postoperative nausea and vomiting)     Restless leg syndrome        Past Surgical History:   Procedure Laterality Date    ACDF  2008    APPENDECTOMY      COLONOSCOPY  2011    normal findings per patient report    COLONOSCOPY N/A 11/12/2021    Procedure: COLONOSCOPY;  Surgeon: Valentin Espinoza MD;  Location: [facility] ENDO;  Service: Endoscopy;  Laterality: N/A;    ESOPHAGOGASTRODUODENOSCOPY N/A 11/12/2021    Procedure: EGD (ESOPHAGOGASTRODUODENOSCOPY);  Surgeon: Valentin Espinoza MD;  Location: [facility] ENDO;  Service: Endoscopy;  Laterality: N/A;    HYSTERECTOMY      INJECTION OF ANESTHETIC AGENT AROUND MEDIAL BRANCH NERVES INNERVATING LUMBAR FACET JOINT Bilateral 3/31/2023    Procedure: Block-nerve-medial branch-lumbar L4/5 AND L5/S1;  Surgeon: Gavino Rodríguez MD;  Location: [facility] OR;  Service: Pain Management;  Laterality: Bilateral;  L4/5 and L5/s1 ( pt would like to be last case please)    RADIOFREQUENCY THERMAL COAGULATION OF MEDIAL BRANCH OF POSTERIOR RAMUS OF CERVICAL SPINAL NERVE Left 3/11/2021    Procedure: RADIOFREQUENCY THERMAL COAGULATION, NERVE, SPINAL, CERVICAL C3,4,5,6;  Surgeon: Gavino Rodríguez MD;  Location: [facility] OR;  Service: Pain Management;  Laterality: Left;    SPINAL CORD STIMULATOR IMPLANT      UPPER GASTROINTESTINAL ENDOSCOPY  2011    normal findings per patient report, negative for h pylori       Family History   Problem Relation Age of Onset    Stomach cancer Father         h pylori associated    Colon cancer Neg Hx     Crohn's disease Neg Hx     Ulcerative colitis Neg Hx     Celiac disease Neg Hx        Social History     Socioeconomic History    Marital status:    Tobacco Use    Smoking  "status: Every Day     Packs/day: 1.00     Years: 20.00     Pack years: 20.00     Types: Cigarettes    Smokeless tobacco: Never   Substance and Sexual Activity    Alcohol use: Yes     Alcohol/week: 4.0 - 5.0 standard drinks     Types: 3 - 4 Cans of beer, 1 Shots of liquor per week     Comment: over a week    Drug use: Not Currently     Types: Other-see comments, Oxycodone     Comment: ultram       Current Facility-Administered Medications   Medication Dose Route Frequency Provider Last Rate Last Admin    lactated ringers infusion   Intravenous Continuous Gavino Rodríguez MD 25 mL/hr at 05/05/23 1542 New Bag at 05/05/23 1542       Review of patient's allergies indicates:  No Known Allergies    Vitals:    05/01/23 1527 05/05/23 1539   BP:  139/74   Pulse:  80   Resp:  17   Temp:  98.2 °F (36.8 °C)   TempSrc:  Skin   SpO2:  96%   Weight: 86.6 kg (191 lb)    Height: 5' 6" (1.676 m)        ASA 2, Mallampati 2    REVIEW OF SYSTEMS:     GENERAL: No weight loss, malaise or fevers.  HEENT:  No recent changes in vision or hearing  NECK: Negative for lumps, no difficulty with swallowing.  RESPIRATORY: Negative for cough, wheezing or shortness of breath, patient denies any recent URI.  CARDIOVASCULAR: Negative for chest pain, leg swelling or palpitations.  GI: Negative for abdominal discomfort, blood in stools or black stools or change in bowel habits.  MUSCULOSKELETAL: See HPI.  SKIN: Negative for lesions, rash, and itching.  PSYCH: No suicidal or homicidal ideations, no current mood disturbances.  HEMATOLOGY/LYMPHOLOGY: Negative for prolonged bleeding, bruising easily or swollen nodes. Patient is not currently taking any anti-coagulants  ENDO: No history of diabetes or thyroid dysfunction  NEURO: No history of syncope, paralysis, seizures or tremors.All other reviewed and negative other than HPI.    Physical exam:  Gen: A and O x3, pleasant, well-groomed  Skin: No rashes or obvious lesions  HEENT: PERRLA, no obvious " deformities on ears or in canals. No thyroid masses, trachea midline, no palpable lymph nodes in neck, axilla.  CVS: Regular rate and rhythm, normal S1 and S2, no murmurs.  Resp: Clear to auscultation bilaterally.  Abdomen: Soft, NT/ND, normal bowel sounds present.  Musculoskeletal/Neuro: Moving all extremities    Assessment:  Lumbar spondylosis  -     Case Request Operating Room: Block-nerve-medial branch-lumbar L4/5, L5/S1  -     Place in Outpatient; Standing  -     Vital signs; Standing  -     Place 18-22 gauage peripheral IV ; Standing  -     Verify informed consent; Standing  -     Notify physician ; Standing  -     Notify physician ; Standing  -     Notify physician (specify); Standing  -     Diet NPO; Standing  -     lactated ringers infusion    Other orders  -     IP VTE LOW RISK PATIENT; Standing

## 2023-05-05 NOTE — DISCHARGE SUMMARY
[facility] - [facility]  Discharge Note  Short Stay    Procedure(s) (LRB):  Block-nerve-medial branch-lumbar L4/5, L5/S1 (Bilateral)      OUTCOME: Patient tolerated treatment/procedure well without complication and is now ready for discharge.    DISPOSITION: Home or Self Care    FINAL DIAGNOSIS:  Lumbar spondylosis    FOLLOWUP: In clinic    DISCHARGE INSTRUCTIONS:    Discharge Procedure Orders   Diet Adult Regular     No dressing needed     Notify your health care provider if you experience any of the following:  temperature >100.4     Activity as tolerated

## 2023-05-08 ENCOUNTER — TELEPHONE (OUTPATIENT)
Dept: PAIN MEDICINE | Facility: CLINIC | Age: 63
End: 2023-05-08
Payer: OTHER GOVERNMENT

## 2023-05-26 NOTE — TELEPHONE ENCOUNTER
"Spoke with patient on 5/26/2023. Patient stated.."After I had the procedure I had 85%-90% pain relief and it lasted through lunch time the following day. I had no pain after the procedure.Relief lasted until lunch time the following day. I was able to do pretty much anything after the procedure. I do want to shedule my next procedure."  Explained to patient that this info would be sent to pain clinic and someone would be contacting her about any future procedures/appointments. Patient voiced understanding.     Rosa Fong LPN  "

## 2023-06-08 ENCOUNTER — PATIENT MESSAGE (OUTPATIENT)
Dept: PAIN MEDICINE | Facility: CLINIC | Age: 63
End: 2023-06-08
Payer: OTHER GOVERNMENT

## 2023-06-09 DIAGNOSIS — M47.816 LUMBAR SPONDYLOSIS: Primary | ICD-10-CM

## 2023-06-09 RX ORDER — SODIUM CHLORIDE, SODIUM LACTATE, POTASSIUM CHLORIDE, CALCIUM CHLORIDE 600; 310; 30; 20 MG/100ML; MG/100ML; MG/100ML; MG/100ML
INJECTION, SOLUTION INTRAVENOUS CONTINUOUS
Status: CANCELLED | OUTPATIENT
Start: 2023-06-09

## 2023-08-21 DIAGNOSIS — M25.511 RIGHT SHOULDER PAIN: Primary | ICD-10-CM

## 2023-08-24 ENCOUNTER — OFFICE VISIT (OUTPATIENT)
Dept: ORTHOPEDICS | Facility: CLINIC | Age: 63
End: 2023-08-24
Payer: OTHER GOVERNMENT

## 2023-08-24 ENCOUNTER — HOSPITAL ENCOUNTER (OUTPATIENT)
Dept: RADIOLOGY | Facility: HOSPITAL | Age: 63
Discharge: HOME | End: 2023-08-24
Attending: ORTHOPAEDIC SURGERY
Payer: OTHER GOVERNMENT

## 2023-08-24 VITALS — BODY MASS INDEX: 29.89 KG/M2 | WEIGHT: 186 LBS | HEIGHT: 66 IN

## 2023-08-24 DIAGNOSIS — M25.511 RIGHT SHOULDER PAIN: ICD-10-CM

## 2023-08-24 DIAGNOSIS — M25.511 RIGHT SHOULDER PAIN: Primary | ICD-10-CM

## 2023-08-24 PROCEDURE — 73030 X-RAY EXAM OF SHOULDER: CPT | Mod: 26,RT,, | Performed by: RADIOLOGY

## 2023-08-24 PROCEDURE — 99999PBSHW PR PBB SHADOW TECHNICAL ONLY FILED TO HB: ICD-10-PCS | Mod: PBBFAC,,,

## 2023-08-24 PROCEDURE — 99204 PR OFFICE/OUTPT VISIT, NEW, LEVL IV, 45-59 MIN: ICD-10-PCS | Mod: 25,S$PBB,, | Performed by: ORTHOPAEDIC SURGERY

## 2023-08-24 PROCEDURE — 99204 OFFICE O/P NEW MOD 45 MIN: CPT | Mod: 25,S$PBB,, | Performed by: ORTHOPAEDIC SURGERY

## 2023-08-24 PROCEDURE — 99999 PR PBB SHADOW E&M-EST. PATIENT-LVL III: ICD-10-PCS | Mod: PBBFAC,,, | Performed by: ORTHOPAEDIC SURGERY

## 2023-08-24 PROCEDURE — 20610 LARGE JOINT ASPIRATION/INJECTION: R SUBACROMIAL BURSA: ICD-10-PCS | Mod: S$PBB,RT,, | Performed by: ORTHOPAEDIC SURGERY

## 2023-08-24 PROCEDURE — 99213 OFFICE O/P EST LOW 20 MIN: CPT | Mod: PBBFAC,PN | Performed by: ORTHOPAEDIC SURGERY

## 2023-08-24 PROCEDURE — 73030 XR SHOULDER TRAUMA 3 VIEW RIGHT: ICD-10-PCS | Mod: 26,RT,, | Performed by: RADIOLOGY

## 2023-08-24 PROCEDURE — 20610 DRAIN/INJ JOINT/BURSA W/O US: CPT | Mod: PBBFAC,PN,RT | Performed by: ORTHOPAEDIC SURGERY

## 2023-08-24 PROCEDURE — 20610 DRAIN/INJ JOINT/BURSA W/O US: CPT | Mod: PBBFAC,PN | Performed by: ORTHOPAEDIC SURGERY

## 2023-08-24 PROCEDURE — 99999PBSHW PR PBB SHADOW TECHNICAL ONLY FILED TO HB: Mod: PBBFAC,,,

## 2023-08-24 PROCEDURE — 73030 X-RAY EXAM OF SHOULDER: CPT | Mod: TC,PO,RT

## 2023-08-24 PROCEDURE — 99999 PR PBB SHADOW E&M-EST. PATIENT-LVL III: CPT | Mod: PBBFAC,,, | Performed by: ORTHOPAEDIC SURGERY

## 2023-08-24 RX ORDER — TRIAMCINOLONE ACETONIDE 40 MG/ML
80 INJECTION, SUSPENSION INTRA-ARTICULAR; INTRAMUSCULAR
Status: DISCONTINUED | OUTPATIENT
Start: 2023-08-24 | End: 2023-08-24 | Stop reason: HOSPADM

## 2023-08-24 RX ADMIN — TRIAMCINOLONE ACETONIDE 80 MG: 40 INJECTION, SUSPENSION INTRA-ARTICULAR; INTRAMUSCULAR at 03:08

## 2023-08-24 NOTE — PROCEDURES
Large Joint Aspiration/Injection: R subacromial bursa    Date/Time: 8/24/2023 3:30 PM    Performed by: Simone Alfredo MD  Authorized by: Simone Alfredo MD    Consent Done?:  Yes (Verbal)  Site marked: the procedure site was marked    Prep: patient was prepped and draped in usual sterile fashion      Details:  Needle Size:  21 G  Approach:  Posterior  Location:  Shoulder  Site:  R subacromial bursa  Medications:  80 mg triamcinolone acetonide 40 mg/mL  Patient tolerance:  Patient tolerated the procedure well with no immediate complications

## 2023-08-24 NOTE — PROGRESS NOTES
63 years old right shoulder pain for about 4 months time no trauma did remodel our house possibly exacerbated 8 on the pain scale good days 10 on bad days difficulty put her arm up overhead    Exam shows cervical motion does not reproduce her symptoms, positive Neer Webster impingement sign, weak and painful cuff strength     X-rays show AC arthrosis     Assessment:  Degenerative AC arthrosis right shoulder, degenerative cuff disease     Plan:  Kenalog injection right shoulder subacromial space, home exercise program, follow-up as needed

## 2023-09-11 ENCOUNTER — PATIENT MESSAGE (OUTPATIENT)
Dept: PAIN MEDICINE | Facility: CLINIC | Age: 63
End: 2023-09-11
Payer: OTHER GOVERNMENT

## 2023-09-11 ENCOUNTER — PATIENT MESSAGE (OUTPATIENT)
Dept: ORTHOPEDICS | Facility: CLINIC | Age: 63
End: 2023-09-11
Payer: OTHER GOVERNMENT

## 2023-09-14 ENCOUNTER — OFFICE VISIT (OUTPATIENT)
Dept: ORTHOPEDICS | Facility: CLINIC | Age: 63
End: 2023-09-14
Payer: OTHER GOVERNMENT

## 2023-09-14 VITALS — WEIGHT: 186 LBS | BODY MASS INDEX: 29.89 KG/M2 | HEIGHT: 66 IN

## 2023-09-14 DIAGNOSIS — M19.011 PRIMARY OSTEOARTHRITIS OF RIGHT SHOULDER: Primary | ICD-10-CM

## 2023-09-14 PROCEDURE — 99999PBSHW PR PBB SHADOW TECHNICAL ONLY FILED TO HB: ICD-10-PCS | Mod: PBBFAC,,,

## 2023-09-14 PROCEDURE — 99214 OFFICE O/P EST MOD 30 MIN: CPT | Mod: 25,S$PBB,, | Performed by: ORTHOPAEDIC SURGERY

## 2023-09-14 PROCEDURE — 99213 OFFICE O/P EST LOW 20 MIN: CPT | Mod: PBBFAC,PN | Performed by: ORTHOPAEDIC SURGERY

## 2023-09-14 PROCEDURE — 20610 DRAIN/INJ JOINT/BURSA W/O US: CPT | Mod: PBBFAC,PN,RT | Performed by: ORTHOPAEDIC SURGERY

## 2023-09-14 PROCEDURE — 99999PBSHW PR PBB SHADOW TECHNICAL ONLY FILED TO HB: Mod: PBBFAC,,,

## 2023-09-14 PROCEDURE — 99214 PR OFFICE/OUTPT VISIT, EST, LEVL IV, 30-39 MIN: ICD-10-PCS | Mod: 25,S$PBB,, | Performed by: ORTHOPAEDIC SURGERY

## 2023-09-14 PROCEDURE — 20610 LARGE JOINT ASPIRATION/INJECTION: R SUBACROMIAL BURSA: ICD-10-PCS | Mod: S$PBB,RT,, | Performed by: ORTHOPAEDIC SURGERY

## 2023-09-14 PROCEDURE — 99999 PR PBB SHADOW E&M-EST. PATIENT-LVL III: CPT | Mod: PBBFAC,,, | Performed by: ORTHOPAEDIC SURGERY

## 2023-09-14 PROCEDURE — 99999 PR PBB SHADOW E&M-EST. PATIENT-LVL III: ICD-10-PCS | Mod: PBBFAC,,, | Performed by: ORTHOPAEDIC SURGERY

## 2023-09-14 RX ORDER — TRIAMCINOLONE ACETONIDE 40 MG/ML
80 INJECTION, SUSPENSION INTRA-ARTICULAR; INTRAMUSCULAR
Status: DISCONTINUED | OUTPATIENT
Start: 2023-09-14 | End: 2023-09-14 | Stop reason: HOSPADM

## 2023-09-14 RX ADMIN — TRIAMCINOLONE ACETONIDE 80 MG: 40 INJECTION, SUSPENSION INTRA-ARTICULAR; INTRAMUSCULAR at 03:09

## 2023-09-14 NOTE — PROCEDURES
Large Joint Aspiration/Injection: R subacromial bursa    Date/Time: 9/14/2023 3:15 PM    Performed by: Simone Alfredo MD  Authorized by: Simone Alfredo MD    Consent Done?:  Yes (Verbal)  Site marked: the procedure site was marked    Prep: patient was prepped and draped in usual sterile fashion      Details:  Needle Size:  21 G  Approach:  Posterior  Location:  Shoulder  Site:  R subacromial bursa  Medications:  80 mg triamcinolone acetonide 40 mg/mL  Patient tolerance:  Patient tolerated the procedure well with no immediate complications

## 2023-09-14 NOTE — PROGRESS NOTES
63 years old persistent pain in the right shoulder we saw her a few weeks ago diagnosed with degenerative of rotator cuff disease and AC arthrosis.  We would given her an injection she states she is not have complete relief partial relief still reports to be in a lot of pain     Exam shows cervical motion is full and painless is not reproduce her symptoms, positive Neer Webster impingement sign no signs infection     X-rays show degenerative changes AC joint glenohumeral joint     Assessment: Degenerative disease of the right shoulder    Plan:  Repeat Kenalog injection right shoulder subacromial space, long-term home exercise program, follow-up as needed    Note the patient states she can not have an MRI of her shoulder which we did offer her today

## 2023-09-20 NOTE — TELEPHONE ENCOUNTER
Physician - Dr Rodríguez    Type of Procedure/Injection - Spinal Cord Stimulator IPG exchange, [employer]    Dx: Chronic pain syndrome, mechanical breakdown of implanted electronic neurostimulator        Laterality - NA      Type of Sedation - MAC      Need to hold medication - No      N/A        Clearance needed - No      Follow up - 7 day post op

## 2023-09-21 ENCOUNTER — PATIENT MESSAGE (OUTPATIENT)
Dept: PAIN MEDICINE | Facility: CLINIC | Age: 63
End: 2023-09-21
Payer: OTHER GOVERNMENT

## 2023-09-21 DIAGNOSIS — G89.4 CHRONIC PAIN DISORDER: Primary | ICD-10-CM

## 2023-09-21 DIAGNOSIS — G89.4 CHRONIC PAIN SYNDROME: ICD-10-CM

## 2023-09-21 DIAGNOSIS — T85.112A MECHANICAL BREAKDOWN OF IMPLANTED ELECTRONIC NEUROSTIMULATOR OF SPINAL CORD, INITIAL ENCOUNTER: ICD-10-CM

## 2023-09-21 RX ORDER — SODIUM CHLORIDE, SODIUM LACTATE, POTASSIUM CHLORIDE, CALCIUM CHLORIDE 600; 310; 30; 20 MG/100ML; MG/100ML; MG/100ML; MG/100ML
INJECTION, SOLUTION INTRAVENOUS CONTINUOUS
Status: CANCELLED | OUTPATIENT
Start: 2023-09-21

## 2023-09-21 NOTE — TELEPHONE ENCOUNTER
Spoke with patient. Patient requested a Friday for procedure. Patient scheduled for 10/13 with Dr. Rodríguez for SCS battery replacement. [employer] message sent to patient with pre op information and 1 week follow up appointment.

## 2023-10-12 ENCOUNTER — PATIENT MESSAGE (OUTPATIENT)
Dept: SURGERY | Facility: HOSPITAL | Age: 63
End: 2023-10-12
Payer: OTHER GOVERNMENT

## 2023-10-19 ENCOUNTER — ANESTHESIA EVENT (OUTPATIENT)
Dept: SURGERY | Facility: HOSPITAL | Age: 63
End: 2023-10-19
Payer: OTHER GOVERNMENT

## 2023-10-19 ENCOUNTER — PATIENT MESSAGE (OUTPATIENT)
Dept: SURGERY | Facility: HOSPITAL | Age: 63
End: 2023-10-19
Payer: OTHER GOVERNMENT

## 2023-10-19 ENCOUNTER — TELEPHONE (OUTPATIENT)
Dept: PAIN MEDICINE | Facility: CLINIC | Age: 63
End: 2023-10-19

## 2023-10-19 NOTE — TELEPHONE ENCOUNTER
----- Message from Yeny Haney sent at 10/19/2023 10:39 AM CDT -----  Contact: pt  Type:  Needs Medical Advice    Who Called: pt    Would the patient rather a call back or a response via [facility]? Call back    Best Call Back Number: [phone]    Additional Information: wants to know if upcoming procedure has been approved?    Please call to advise  Thanks

## 2023-10-20 ENCOUNTER — HOSPITAL ENCOUNTER (OUTPATIENT)
Dept: RADIOLOGY | Facility: HOSPITAL | Age: 63
Discharge: HOME | End: 2023-10-20
Attending: ANESTHESIOLOGY | Admitting: ANESTHESIOLOGY
Payer: OTHER GOVERNMENT

## 2023-10-20 ENCOUNTER — ANESTHESIA (OUTPATIENT)
Dept: SURGERY | Facility: HOSPITAL | Age: 63
End: 2023-10-20
Payer: OTHER GOVERNMENT

## 2023-10-20 ENCOUNTER — HOSPITAL ENCOUNTER (OUTPATIENT)
Facility: HOSPITAL | Age: 63
Discharge: HOME | End: 2023-10-20
Attending: ANESTHESIOLOGY | Admitting: ANESTHESIOLOGY
Payer: OTHER GOVERNMENT

## 2023-10-20 VITALS
TEMPERATURE: 97 F | BODY MASS INDEX: 29.73 KG/M2 | HEART RATE: 85 BPM | WEIGHT: 185 LBS | RESPIRATION RATE: 12 BRPM | HEIGHT: 66 IN | SYSTOLIC BLOOD PRESSURE: 100 MMHG | OXYGEN SATURATION: 96 % | DIASTOLIC BLOOD PRESSURE: 57 MMHG

## 2023-10-20 DIAGNOSIS — M54.50 LOWER BACK PAIN: ICD-10-CM

## 2023-10-20 DIAGNOSIS — G89.4 CHRONIC PAIN DISORDER: ICD-10-CM

## 2023-10-20 DIAGNOSIS — T85.112A MECHANICAL BREAKDOWN OF IMPLANTED ELECTRONIC NEUROSTIMULATOR OF SPINAL CORD, INITIAL ENCOUNTER: ICD-10-CM

## 2023-10-20 DIAGNOSIS — G89.4 CHRONIC PAIN SYNDROME: ICD-10-CM

## 2023-10-20 PROCEDURE — 76000 FLUOROSCOPY <1 HR PHYS/QHP: CPT | Mod: TC,PO

## 2023-10-20 PROCEDURE — 63685 INS/RPLC SPI NPG/RCVR POCKET: CPT | Performed by: ANESTHESIOLOGY

## 2023-10-20 PROCEDURE — 63600175 PHARM REV CODE 636 W HCPCS: Mod: PO | Performed by: ANESTHESIOLOGY

## 2023-10-20 PROCEDURE — D9220A PRA ANESTHESIA: Mod: ANES,,, | Performed by: ANESTHESIOLOGY

## 2023-10-20 PROCEDURE — D9220A PRA ANESTHESIA: ICD-10-PCS | Mod: CRNA,,, | Performed by: NURSE ANESTHETIST, CERTIFIED REGISTERED

## 2023-10-20 PROCEDURE — C1820 GENERATOR NEURO RECHG BAT SY: HCPCS | Mod: PO | Performed by: ANESTHESIOLOGY

## 2023-10-20 PROCEDURE — D9220A PRA ANESTHESIA: ICD-10-PCS | Mod: ANES,,, | Performed by: ANESTHESIOLOGY

## 2023-10-20 PROCEDURE — 71000033 HC RECOVERY, INTIAL HOUR: Mod: PO | Performed by: ANESTHESIOLOGY

## 2023-10-20 PROCEDURE — 36000706: Mod: PO | Performed by: ANESTHESIOLOGY

## 2023-10-20 PROCEDURE — D9220A PRA ANESTHESIA: Mod: CRNA,,, | Performed by: NURSE ANESTHETIST, CERTIFIED REGISTERED

## 2023-10-20 PROCEDURE — 37000009 HC ANESTHESIA EA ADD 15 MINS: Mod: PO | Performed by: ANESTHESIOLOGY

## 2023-10-20 PROCEDURE — 25000003 PHARM REV CODE 250: Mod: PO | Performed by: ANESTHESIOLOGY

## 2023-10-20 PROCEDURE — 37000008 HC ANESTHESIA 1ST 15 MINUTES: Mod: PO | Performed by: ANESTHESIOLOGY

## 2023-10-20 PROCEDURE — 25000003 PHARM REV CODE 250: Mod: PO | Performed by: NURSE ANESTHETIST, CERTIFIED REGISTERED

## 2023-10-20 PROCEDURE — 63685 INS/RPLC SPI NPG/RCVR POCKET: CPT | Mod: ,,, | Performed by: ANESTHESIOLOGY

## 2023-10-20 PROCEDURE — 63600175 PHARM REV CODE 636 W HCPCS: Mod: PO | Performed by: NURSE ANESTHETIST, CERTIFIED REGISTERED

## 2023-10-20 PROCEDURE — 36000707: Mod: PO | Performed by: ANESTHESIOLOGY

## 2023-10-20 PROCEDURE — 63685 PR IMPLANT SPINAL NEUROSTIM/RECEIVER: ICD-10-PCS | Mod: ,,, | Performed by: ANESTHESIOLOGY

## 2023-10-20 DEVICE — IMPLANTABLE DEVICE: Type: IMPLANTABLE DEVICE | Site: BACK | Status: FUNCTIONAL

## 2023-10-20 RX ORDER — PROPOFOL 10 MG/ML
VIAL (ML) INTRAVENOUS
Status: DISCONTINUED | OUTPATIENT
Start: 2023-10-20 | End: 2023-10-20

## 2023-10-20 RX ORDER — LIDOCAINE HYDROCHLORIDE 10 MG/ML
1 INJECTION, SOLUTION EPIDURAL; INFILTRATION; INTRACAUDAL; PERINEURAL ONCE
Status: ACTIVE | OUTPATIENT
Start: 2023-10-20

## 2023-10-20 RX ORDER — CEFAZOLIN SODIUM 2 G/50ML
2 SOLUTION INTRAVENOUS
Status: COMPLETED | OUTPATIENT
Start: 2023-10-20 | End: 2023-10-20

## 2023-10-20 RX ORDER — MIDAZOLAM HYDROCHLORIDE 1 MG/ML
INJECTION INTRAMUSCULAR; INTRAVENOUS
Status: DISCONTINUED | OUTPATIENT
Start: 2023-10-20 | End: 2023-10-20

## 2023-10-20 RX ORDER — SODIUM CHLORIDE, SODIUM LACTATE, POTASSIUM CHLORIDE, CALCIUM CHLORIDE 600; 310; 30; 20 MG/100ML; MG/100ML; MG/100ML; MG/100ML
INJECTION, SOLUTION INTRAVENOUS CONTINUOUS
Status: DISPENSED | OUTPATIENT
Start: 2023-10-20

## 2023-10-20 RX ORDER — LIDOCAINE HYDROCHLORIDE 20 MG/ML
INJECTION INTRAVENOUS
Status: DISCONTINUED | OUTPATIENT
Start: 2023-10-20 | End: 2023-10-20

## 2023-10-20 RX ORDER — DEXAMETHASONE SODIUM PHOSPHATE 4 MG/ML
INJECTION, SOLUTION INTRA-ARTICULAR; INTRALESIONAL; INTRAMUSCULAR; INTRAVENOUS; SOFT TISSUE
Status: DISCONTINUED | OUTPATIENT
Start: 2023-10-20 | End: 2023-10-20

## 2023-10-20 RX ORDER — SODIUM CHLORIDE, SODIUM LACTATE, POTASSIUM CHLORIDE, CALCIUM CHLORIDE 600; 310; 30; 20 MG/100ML; MG/100ML; MG/100ML; MG/100ML
INJECTION, SOLUTION INTRAVENOUS CONTINUOUS
Status: DISCONTINUED | OUTPATIENT
Start: 2023-10-20 | End: 2023-10-20 | Stop reason: HOSPADM

## 2023-10-20 RX ORDER — PROPOFOL 10 MG/ML
VIAL (ML) INTRAVENOUS CONTINUOUS PRN
Status: DISCONTINUED | OUTPATIENT
Start: 2023-10-20 | End: 2023-10-20

## 2023-10-20 RX ORDER — DIPHENHYDRAMINE HYDROCHLORIDE 50 MG/ML
12.5 INJECTION INTRAMUSCULAR; INTRAVENOUS EVERY 6 HOURS PRN
Status: ACTIVE | OUTPATIENT
Start: 2023-10-20

## 2023-10-20 RX ORDER — LIDOCAINE HYDROCHLORIDE AND EPINEPHRINE 10; 10 MG/ML; UG/ML
INJECTION, SOLUTION INFILTRATION; PERINEURAL
Status: DISCONTINUED | OUTPATIENT
Start: 2023-10-20 | End: 2023-10-20 | Stop reason: HOSPADM

## 2023-10-20 RX ORDER — FENTANYL CITRATE 50 UG/ML
25 INJECTION, SOLUTION INTRAMUSCULAR; INTRAVENOUS EVERY 5 MIN PRN
Status: ACTIVE | OUTPATIENT
Start: 2023-10-20

## 2023-10-20 RX ORDER — OXYCODONE HYDROCHLORIDE 5 MG/1
5 TABLET ORAL
Status: ACTIVE | OUTPATIENT
Start: 2023-10-20

## 2023-10-20 RX ORDER — PHENYLEPHRINE HYDROCHLORIDE 10 MG/ML
INJECTION INTRAVENOUS
Status: DISCONTINUED | OUTPATIENT
Start: 2023-10-20 | End: 2023-10-20

## 2023-10-20 RX ORDER — ONDANSETRON 2 MG/ML
INJECTION INTRAMUSCULAR; INTRAVENOUS
Status: DISCONTINUED | OUTPATIENT
Start: 2023-10-20 | End: 2023-10-20

## 2023-10-20 RX ORDER — MEPERIDINE HYDROCHLORIDE 50 MG/ML
12.5 INJECTION INTRAMUSCULAR; INTRAVENOUS; SUBCUTANEOUS ONCE AS NEEDED
Status: ACTIVE | OUTPATIENT
Start: 2023-10-20 | End: 2023-10-21

## 2023-10-20 RX ORDER — FENTANYL CITRATE 50 UG/ML
INJECTION, SOLUTION INTRAMUSCULAR; INTRAVENOUS
Status: DISCONTINUED | OUTPATIENT
Start: 2023-10-20 | End: 2023-10-20

## 2023-10-20 RX ORDER — HYDROMORPHONE HYDROCHLORIDE 2 MG/ML
0.2 INJECTION, SOLUTION INTRAMUSCULAR; INTRAVENOUS; SUBCUTANEOUS EVERY 5 MIN PRN
Status: ACTIVE | OUTPATIENT
Start: 2023-10-20

## 2023-10-20 RX ADMIN — PHENYLEPHRINE HYDROCHLORIDE 100 MCG: 10 INJECTION INTRAVENOUS at 10:10

## 2023-10-20 RX ADMIN — GLYCOPYRROLATE 0.2 MG: 0.2 INJECTION, SOLUTION INTRAMUSCULAR; INTRAVENOUS at 10:10

## 2023-10-20 RX ADMIN — LIDOCAINE HYDROCHLORIDE 100 MG: 20 INJECTION INTRAVENOUS at 10:10

## 2023-10-20 RX ADMIN — PROPOFOL 50 MG: 10 INJECTION, EMULSION INTRAVENOUS at 10:10

## 2023-10-20 RX ADMIN — CEFAZOLIN SODIUM 2 G: 2 SOLUTION INTRAVENOUS at 10:10

## 2023-10-20 RX ADMIN — PROPOFOL 100 MCG/KG/MIN: 10 INJECTION, EMULSION INTRAVENOUS at 10:10

## 2023-10-20 RX ADMIN — ONDANSETRON 4 MG: 2 INJECTION, SOLUTION INTRAMUSCULAR; INTRAVENOUS at 10:10

## 2023-10-20 RX ADMIN — FENTANYL CITRATE 50 MCG: 50 INJECTION, SOLUTION INTRAMUSCULAR; INTRAVENOUS at 10:10

## 2023-10-20 RX ADMIN — SODIUM CHLORIDE, POTASSIUM CHLORIDE, SODIUM LACTATE AND CALCIUM CHLORIDE: 600; 310; 30; 20 INJECTION, SOLUTION INTRAVENOUS at 09:10

## 2023-10-20 RX ADMIN — DEXAMETHASONE SODIUM PHOSPHATE 4 MG: 4 INJECTION, SOLUTION INTRAMUSCULAR; INTRAVENOUS at 10:10

## 2023-10-20 RX ADMIN — MIDAZOLAM HYDROCHLORIDE 2 MG: 1 INJECTION, SOLUTION INTRAMUSCULAR; INTRAVENOUS at 10:10

## 2023-10-20 RX ADMIN — SODIUM CHLORIDE, POTASSIUM CHLORIDE, SODIUM LACTATE AND CALCIUM CHLORIDE: 600; 310; 30; 20 INJECTION, SOLUTION INTRAVENOUS at 10:10

## 2023-10-20 NOTE — OP NOTE
PROCEDURE DATE: 10/20/2023    PROCEDURE:   1. Spinal Cord Stimulator IPG exchange    Pre-op diagnosis:  1. Chronic pain syndrome  2. Mechanical breakdown of implanted electronic neurostimulator for spinal cord, IPG    Post-op diagnosis: Same    Surgeon: Dr. Gavino Rodríguez    Assistant: None     Anesthesia: Monitored Anesthesia Care    Estimated Blood Loss: Minimal- <10ml    Urine Output: Not Measured    IV Fluids- See Anesthesia record    Complications: none    CONSENT: The risks, benefits, and options were discussed thoroughly with the patient. The patient's questions were answered. The patient understands the risk and benefits and wishes to proceed. Informed consent was obtained.     Technique:  Site of the implantable pulse generator was marked on the patients left side in the preoperative area. The patient was taken to the OR and placed in the prone position. The anesthesia provider throughout the case provided sedation and cardiopulmonary monitoring.   The Patient's back was prepped with Duraprep and then draped in usual sterile fashion. Local anesthetic was used at the IPG site with  1% lidocaine with 1:100,00 epinephrine Using a No. 10 scalpel, an incision was made over the left buttock scar from previous IPG placement and dissection carried out with blunt dissection. The battery was accessed and removed from pocket still attached to leads. The leads were removed from the old battery and then placed in exact location of the new battery. Then the system was checked by device representative in sterile fashion, found to be completely functional. Copious antibiotic bulb lavage was irrigated throughout the field and pocket, and hemostasis was maintained. The battery was then placed in the pocket.    Then the wound was closed with simple interrupted sutures using 0-0 vicryl sutures and skin closed with 4-0 monocryl. Steristrips were placed over the incision sites and dressings applied. Sponge and needle counts  were correct x2 at conclusion of the case.     Patient was then placed supine on the stretcher and transferred to the recovery room. Patient was programmed by the representative of the SCS. Patient was instructed not to perform any abrupt movements with the back, avoid bending, twisting, or lifting >10lbs. Thee patient has been scheduled to return to the clinic in approx 5-7 days. The patient tolerated the procedure well.

## 2023-10-20 NOTE — H&P
CC: Back pain    HPI: The patient is a 62yo woman with a history of chronic pain syndrome, lumbar radiculitis here for SCS battery replacement. There are no major changes in history and physical from 2/13/23.    Past Medical History:   Diagnosis Date    Arthritis     Hypertension     Insomnia     PONV (postoperative nausea and vomiting)     Restless leg syndrome        Past Surgical History:   Procedure Laterality Date    ACDF  2008    APPENDECTOMY      COLONOSCOPY  2011    normal findings per patient report    COLONOSCOPY N/A 11/12/2021    Procedure: COLONOSCOPY;  Surgeon: Valentin Espinoza MD;  Location: [facility];  Service: Endoscopy;  Laterality: N/A;    ESOPHAGOGASTRODUODENOSCOPY N/A 11/12/2021    Procedure: EGD (ESOPHAGOGASTRODUODENOSCOPY);  Surgeon: Valentin Espinoza MD;  Location: [facility];  Service: Endoscopy;  Laterality: N/A;    HYSTERECTOMY      INJECTION OF ANESTHETIC AGENT AROUND MEDIAL BRANCH NERVES INNERVATING LUMBAR FACET JOINT Bilateral 3/31/2023    Procedure: Block-nerve-medial branch-lumbar L4/5 AND L5/S1;  Surgeon: Gavino Rodríguez MD;  Location: [facility];  Service: Pain Management;  Laterality: Bilateral;  L4/5 and L5/s1 ( pt would like to be last case please)    INJECTION OF ANESTHETIC AGENT AROUND MEDIAL BRANCH NERVES INNERVATING LUMBAR FACET JOINT Bilateral 5/5/2023    Procedure: Block-nerve-medial branch-lumbar L4/5, L5/S1;  Surgeon: Gavino Rodríguez MD;  Location: [facility];  Service: Pain Management;  Laterality: Bilateral;    RADIOFREQUENCY ABLATION OF LUMBAR MEDIAL BRANCH NERVE AT SINGLE LEVEL Bilateral 6/20/2023    Procedure: Radiofrequency Ablation, Nerve, Spinal, Lumbar, Medial Branch, L4/5, L5/S1;  Surgeon: Gavino Rodríguez MD;  Location: [facility];  Service: Pain Management;  Laterality: Bilateral;    RADIOFREQUENCY THERMAL COAGULATION OF MEDIAL BRANCH OF POSTERIOR RAMUS OF CERVICAL SPINAL NERVE Left 3/11/2021    Procedure: RADIOFREQUENCY THERMAL COAGULATION, NERVE,  "SPINAL, CERVICAL C3,4,5,6;  Surgeon: Gavino Rodríguez MD;  Location: [facility];  Service: Pain Management;  Laterality: Left;    SPINAL CORD STIMULATOR IMPLANT      UPPER GASTROINTESTINAL ENDOSCOPY  2011    normal findings per patient report, negative for h pylori       Family History   Problem Relation Age of Onset    Stomach cancer Father         h pylori associated    Colon cancer Neg Hx     Crohn's disease Neg Hx     Ulcerative colitis Neg Hx     Celiac disease Neg Hx        Social History     Socioeconomic History    Marital status:    Tobacco Use    Smoking status: Every Day     Current packs/day: 1.00     Average packs/day: 1 pack/day for 20.0 years (20.0 ttl pk-yrs)     Types: Cigarettes    Smokeless tobacco: Never   Substance and Sexual Activity    Alcohol use: Yes     Alcohol/week: 4.0 - 5.0 standard drinks of alcohol     Types: 3 - 4 Cans of beer, 1 Shots of liquor per week     Comment: over a week    Drug use: Not Currently     Types: Other-see comments, Oxycodone     Comment: ultram       Current Facility-Administered Medications   Medication Dose Route Frequency Provider Last Rate Last Admin    cefazolin (ANCEF) 2 gram in dextrose 5% 50 mL IVPB (premix)  2 g Intravenous On Call Procedure Gavino Rodríguez MD        electrolyte-S (ISOLYTE)   Intravenous Continuous Jose Alfredo Ibarra MD        lactated ringers infusion   Intravenous Continuous Jose Alfredo Ibarra MD        lactated ringers infusion   Intravenous Continuous Gavino Rodríguez MD 25 mL/hr at 10/20/23 0945 New Bag at 10/20/23 0945    LIDOcaine (PF) 10 mg/ml (1%) injection 10 mg  1 mL Intradermal Once Jose Alfredo Ibarra MD           Review of patient's allergies indicates:  No Known Allergies    Vitals:    10/10/23 1430 10/16/23 1559 10/20/23 0920 10/20/23 0944   BP:    120/62   Pulse:    86   Resp:    16   Temp:   97.7 °F (36.5 °C)    TempSrc:   Skin    SpO2:    96%   Weight: 83.9 kg (185 lb) 83.9 kg (185 lb)     Height: 5' 6" (1.676 " "m) 5' 6" (1.676 m)         ASA 2, Mallampati 2    REVIEW OF SYSTEMS:     GENERAL: No weight loss, malaise or fevers.  HEENT:  No recent changes in vision or hearing  NECK: Negative for lumps, no difficulty with swallowing.  RESPIRATORY: Negative for cough, wheezing or shortness of breath, patient denies any recent URI.  CARDIOVASCULAR: Negative for chest pain, leg swelling or palpitations.  GI: Negative for abdominal discomfort, blood in stools or black stools or change in bowel habits.  MUSCULOSKELETAL: See HPI.  SKIN: Negative for lesions, rash, and itching.  PSYCH: No suicidal or homicidal ideations, no current mood disturbances.  HEMATOLOGY/LYMPHOLOGY: Negative for prolonged bleeding, bruising easily or swollen nodes. Patient is not currently taking any anti-coagulants  ENDO: No history of diabetes or thyroid dysfunction  NEURO: No history of syncope, paralysis, seizures or tremors.All other reviewed and negative other than HPI.    Physical exam:  Gen: A and O x3, pleasant, well-groomed  Skin: No rashes or obvious lesions  HEENT: PERRLA, no obvious deformities on ears or in canals. No thyroid masses, trachea midline, no palpable lymph nodes in neck, axilla.  CVS: Regular rate and rhythm, normal S1 and S2, no murmurs.  Resp: Clear to auscultation bilaterally.  Abdomen: Soft, NT/ND, normal bowel sounds present.  Musculoskeletal/Neuro: Moving all extremities    Assessment:  Chronic pain disorder  -     Case Request Operating Room: Replacement, Battery, Neurostimulator  IPG EXCHANGE  -     Place in Outpatient; Standing  -     Vital signs; Standing  -     Place 18-22 gauage peripheral IV ; Standing  -     Verify informed consent; Standing  -     Notify physician ; Standing  -     Notify physician ; Standing  -     Notify physician (specify); Standing  -     Diet NPO; Standing  -     lactated ringers infusion  -     cefazolin (ANCEF) 2 gram in dextrose 5% 50 mL IVPB (premix)    Mechanical breakdown of implanted " electronic neurostimulator of spinal cord, initial encounter  -     Case Request Operating Room: Replacement, Battery, Neurostimulator  IPG EXCHANGE  -     Place in Outpatient; Standing  -     Vital signs; Standing  -     Place 18-22 gauage peripheral IV ; Standing  -     Verify informed consent; Standing  -     Notify physician ; Standing  -     Notify physician ; Standing  -     Notify physician (specify); Standing  -     Diet NPO; Standing  -     lactated ringers infusion  -     cefazolin (ANCEF) 2 gram in dextrose 5% 50 mL IVPB (premix)    Chronic pain syndrome    Other orders  -     IP VTE LOW RISK PATIENT; Standing

## 2023-10-20 NOTE — DISCHARGE INSTRUCTIONS
SPINAL CORD STIMULATOR    Sponge bath only until follow up with the doctor.  No bending, lifting or twisting.   Do not make any movements that cause bandages to pull.  Do not lift your elbows higher than your shoulders.  Do not remove dressings.  No strenuous activities.  Follow up with your physician in one week.  Call your doctor for excessive bleeding or swelling.  Rep will call you tomorrow. Phone number is on the box.    Call [phone] for doctor.

## 2023-10-20 NOTE — ANESTHESIA POSTPROCEDURE EVALUATION
Anesthesia Post Evaluation    Patient: Bria Nathan    Procedure(s) Performed: Procedure(s) (LRB):  Replacement, Battery, Neurostimulator  IPG EXCHANGE (N/A)    Final Anesthesia Type: general      Patient location during evaluation: PACU  Patient participation: Yes- Able to Participate  Level of consciousness: awake and alert  Post-procedure vital signs: reviewed and stable  Pain management: adequate  Airway patency: patent    PONV status at discharge: No PONV  Anesthetic complications: no      Cardiovascular status: blood pressure returned to baseline  Respiratory status: unassisted  Hydration status: euvolemic  Follow-up not needed.          Vitals Value Taken Time   /57 10/20/23 1130   Temp 36.3 °C (97.3 °F) 10/20/23 1100   Pulse 85 10/20/23 1130   Resp 12 10/20/23 1130   SpO2 96 % 10/20/23 1130         Event Time   Out of Recovery 11:30:00         Pain/Wellington Score: Wellington Score: 10 (10/20/2023 11:34 AM)

## 2023-10-20 NOTE — TRANSFER OF CARE
"Anesthesia Transfer of Care Note    Patient: Bria Nathan    Procedure(s) Performed: Procedure(s) (LRB):  Replacement, Battery, Neurostimulator  IPG EXCHANGE (N/A)    Patient location: PACU    Anesthesia Type: general    Transport from OR: Transported from OR on room air with adequate spontaneous ventilation    Post pain: adequate analgesia    Post assessment: no apparent anesthetic complications and tolerated procedure well    Post vital signs: stable    Level of consciousness: awake and alert    Nausea/Vomiting: no nausea/vomiting    Complications: none    Transfer of care protocol was followed      Last vitals:   Visit Vitals  /62 (BP Location: Right arm, Patient Position: Sitting)   Pulse 86   Temp 36.5 °C (97.7 °F) (Skin)   Resp 16   Ht 5' 6" (1.676 m)   Wt 83.9 kg (185 lb)   SpO2 96%   Breastfeeding No   BMI 29.86 kg/m²     "

## 2023-10-20 NOTE — ANESTHESIA PREPROCEDURE EVALUATION
10/20/2023  Bria Nathan is a 63 y.o., female.      Pre-op Assessment    I have reviewed the Patient Summary Reports.     I have reviewed the Nursing Notes. I have reviewed the NPO Status.   I have reviewed the Medications.     Review of Systems  Anesthesia Hx:  Denies Family Hx of Anesthesia complications.  Personal Hx of Anesthesia complications, Post-Operative Nausea/Vomiting, in the past, but not with recent anesthetics / prophylaxis   Hematology/Oncology:        Hematology Comments: Von Willebrand   Cardiovascular:   Hypertension    Musculoskeletal:  Spine Disorders: cervical Chronic Pain    Endocrine:  Obesity / BMI > 30  Psych:   anxiety depression          Physical Exam  General: Cooperative, Alert and Oriented    Airway:  Mallampati: III / II  Mouth Opening: Normal  TM Distance: Normal  Tongue: Normal  Neck ROM: Extension Decreased  Neck: Girth Increased        Anesthesia Plan  Type of Anesthesia, risks & benefits discussed:    Anesthesia Type: Gen Natural Airway, MAC  Intra-op Monitoring Plan: Standard ASA Monitors  Post Op Pain Control Plan: multimodal analgesia  Induction:  IV  Informed Consent: Informed consent signed with the Patient and all parties understand the risks and agree with anesthesia plan.  All questions answered.   ASA Score: 2    Ready For Surgery From Anesthesia Perspective.     .

## 2023-10-20 NOTE — DISCHARGE SUMMARY
[facility] - Surgery  Discharge Note  Short Stay    Procedure(s) (LRB):  Replacement, Battery, Neurostimulator  IPG EXCHANGE (N/A)      OUTCOME: Patient tolerated treatment/procedure well without complication and is now ready for discharge.    DISPOSITION: Home or Self Care    FINAL DIAGNOSIS:  Chronic pain syndrome    FOLLOWUP: In clinic    DISCHARGE INSTRUCTIONS:    Discharge Procedure Orders   Diet Adult Regular     No dressing needed     Notify your health care provider if you experience any of the following:  temperature >100.4     Activity as tolerated

## 2023-10-20 NOTE — PLAN OF CARE
Patient tolerating oral liquids without difficulty. No apparent signs and/or symptoms of distress noted at this time. No complaints voiced at this time. Discharge instructions reviewed with patient/family/friend with good verbal feedback received. Patient ready for discharge.

## 2023-10-24 ENCOUNTER — TELEPHONE (OUTPATIENT)
Dept: PAIN MEDICINE | Facility: CLINIC | Age: 63
End: 2023-10-24

## 2023-10-24 NOTE — TELEPHONE ENCOUNTER
----- Message from Kendra Johnson sent at 10/24/2023 12:41 PM CDT -----  Regarding: Patient advice  Contact: Pt  Pt called in regards to scheduling an 1 week follow up appointment ,Unable to schedule Pt       Pt can be reached at [phone]

## 2023-10-27 ENCOUNTER — OFFICE VISIT (OUTPATIENT)
Dept: PAIN MEDICINE | Facility: CLINIC | Age: 63
End: 2023-10-27
Payer: OTHER GOVERNMENT

## 2023-10-27 VITALS
HEIGHT: 66 IN | BODY MASS INDEX: 29.72 KG/M2 | SYSTOLIC BLOOD PRESSURE: 125 MMHG | WEIGHT: 184.94 LBS | DIASTOLIC BLOOD PRESSURE: 62 MMHG | HEART RATE: 92 BPM

## 2023-10-27 DIAGNOSIS — G89.4 CHRONIC PAIN DISORDER: Primary | ICD-10-CM

## 2023-10-27 DIAGNOSIS — M51.36 DDD (DEGENERATIVE DISC DISEASE), LUMBAR: ICD-10-CM

## 2023-10-27 PROCEDURE — 99024 POSTOP FOLLOW-UP VISIT: CPT | Mod: ,,, | Performed by: PHYSICIAN ASSISTANT

## 2023-10-27 PROCEDURE — 99999 PR PBB SHADOW E&M-EST. PATIENT-LVL IV: ICD-10-PCS | Mod: PBBFAC,,, | Performed by: PHYSICIAN ASSISTANT

## 2023-10-27 PROCEDURE — 99999 PR PBB SHADOW E&M-EST. PATIENT-LVL IV: CPT | Mod: PBBFAC,,, | Performed by: PHYSICIAN ASSISTANT

## 2023-10-27 PROCEDURE — 99214 OFFICE O/P EST MOD 30 MIN: CPT | Mod: PBBFAC,PO | Performed by: PHYSICIAN ASSISTANT

## 2023-10-27 PROCEDURE — 99024 PR POST-OP FOLLOW-UP VISIT: ICD-10-PCS | Mod: ,,, | Performed by: PHYSICIAN ASSISTANT

## 2023-11-02 NOTE — PROGRESS NOTES
This note was completed with dictation software and grammatical errors may exist.    CC:Neck pain, back and leg pain    HPI:  The patient is a 63-year-old woman with a history of chronic cervical and lumbar DDD issues, status post cervical fusion and spinal cord stimulator for lumbar issues who presents in self-referral for continued neck and back pain.  She returns 7 days postop spinal cord stimulator IPG replacement with [employer].  She denies having any fever or chills, reports at least 70% relief of her pain with the device.  She denies any new weakness or new numbness and is pleased.    Prior HPI:  The patient has been in the [employer] for many years and so has had her care through the [facility].  She reports that she was traveling to [place] for [facility] care and would rather stay on this side of the leg.  She reports having chronic neck issues, developed weakness and eventually underwent cervical fusion in 2008.  She did very well with this, resolved her neck and left arm pain but over the last 10 years she has had more neck pain radiating into left arm.  She has undergone cervical epidural steroid injections and radiofrequency ablation is a with some relief.  The last time this was done was about a year ago.  She feels that these provide significant benefit so that she can function well in her continued work.  Her other problem is chronic low back pain, had undergone numerous interventions for this, never had any lumbar surgery, was told that this would be too extensive.  She eventually underwent spinal cord stimulator implantation in 2015 with [employer].  She reports that this has helped tremendously in her bilateral low back and legs.  However, over the last several years she has not had as much relief and states that the [facility] had not reprogrammed the device but instead told her that they would change out the IPG.  She denies any weakness in the arms or legs, states that her pain is fairly  "constant.  It is worse with prolonged sitting or standing, worse in the morning, worse with doing any lifting.  She gets some relief with rest, heat and cold and medications.    Pain intervention history:  She has undergone cervical radiofrequency ablation, unclear what level but states that she gets up to 85% relief for up to 9 months with this.  She has had a [employer] stimulator placed in 2015, it is not MRI compatible.  She is status post left C3, 4, 5, 6 medial branch radiofrequency ablation on 03/11/2021 with 70% relief.     Antineuropathics:  Gabapentin 600 milligrams 3 times daily  NSAIDs:  Physical therapy:  Last physical therapy was in 2019, no benefit.  Walks for exercise  Antidepressants:  Cymbalta 60 milligrams  Muscle relaxers:  Opioids:  Tramadol 50 milligrams 3 times daily with mild relief, rarely takes Percocet half tablet 7.5/325  Antiplatelets/Anticoagulants:    ROS:  She reports easy bruising, difficulty sleeping, anxiety and depression.  Balance of review of systems is negative.    No results found for: "LABA1C", "HGBA1C"    Lab Results   Component Value Date    WBC 9.58 08/20/2020    HGB 13.2 08/20/2020    HCT 38.2 08/20/2020    MCV 96 08/20/2020     (H) 08/20/2020       Past Medical History:   Diagnosis Date    Arthritis     Hypertension     Insomnia     PONV (postoperative nausea and vomiting)     Restless leg syndrome        Past Surgical History:   Procedure Laterality Date    ACDF  2008    APPENDECTOMY      COLONOSCOPY  2011    normal findings per patient report    COLONOSCOPY N/A 11/12/2021    Procedure: COLONOSCOPY;  Surgeon: Valentin Espinoza MD;  Location: [facility];  Service: Endoscopy;  Laterality: N/A;    ESOPHAGOGASTRODUODENOSCOPY N/A 11/12/2021    Procedure: EGD (ESOPHAGOGASTRODUODENOSCOPY);  Surgeon: Valentin Espinoza MD;  Location: [facility] ENDO;  Service: Endoscopy;  Laterality: N/A;    HYSTERECTOMY      INJECTION OF ANESTHETIC AGENT AROUND MEDIAL BRANCH NERVES " INNERVATING LUMBAR FACET JOINT Bilateral 3/31/2023    Procedure: Block-nerve-medial branch-lumbar L4/5 AND L5/S1;  Surgeon: Gavino Rodríguez MD;  Location: [facility];  Service: Pain Management;  Laterality: Bilateral;  L4/5 and L5/s1 ( pt would like to be last case please)    INJECTION OF ANESTHETIC AGENT AROUND MEDIAL BRANCH NERVES INNERVATING LUMBAR FACET JOINT Bilateral 5/5/2023    Procedure: Block-nerve-medial branch-lumbar L4/5, L5/S1;  Surgeon: Gavino Rodríguez MD;  Location: [facility];  Service: Pain Management;  Laterality: Bilateral;    RADIOFREQUENCY ABLATION OF LUMBAR MEDIAL BRANCH NERVE AT SINGLE LEVEL Bilateral 6/20/2023    Procedure: Radiofrequency Ablation, Nerve, Spinal, Lumbar, Medial Branch, L4/5, L5/S1;  Surgeon: Gavino Rodríguez MD;  Location: [facility];  Service: Pain Management;  Laterality: Bilateral;    RADIOFREQUENCY THERMAL COAGULATION OF MEDIAL BRANCH OF POSTERIOR RAMUS OF CERVICAL SPINAL NERVE Left 3/11/2021    Procedure: RADIOFREQUENCY THERMAL COAGULATION, NERVE, SPINAL, CERVICAL C3,4,5,6;  Surgeon: Gavino Rodríguez MD;  Location: [facility];  Service: Pain Management;  Laterality: Left;    REPLACEMENT OF NERVE STIMULATOR BATTERY N/A 10/20/2023    Procedure: Replacement, Battery, Neurostimulator  IPG EXCHANGE;  Surgeon: Gavino Rodríguez MD;  Location: [facility];  Service: Pain Management;  Laterality: N/A;    SPINAL CORD STIMULATOR IMPLANT      UPPER GASTROINTESTINAL ENDOSCOPY  2011    normal findings per patient report, negative for h pylori       Social History     Socioeconomic History    Marital status:    Tobacco Use    Smoking status: Every Day     Current packs/day: 1.00     Average packs/day: 1 pack/day for 20.0 years (20.0 ttl pk-yrs)     Types: Cigarettes    Smokeless tobacco: Never   Substance and Sexual Activity    Alcohol use: Yes     Alcohol/week: 4.0 - 5.0 standard drinks of alcohol     Types: 3 - 4 Cans of beer, 1 Shots of liquor per week     Comment: over a  "week    Drug use: Not Currently     Types: Other-see comments, Oxycodone     Comment: ultram         Medications/Allergies: See med card    Vitals:    10/27/23 1548   BP: 125/62   Pulse: 92   Weight: 83.9 kg (184 lb 15.5 oz)   Height: 5' 6" (1.676 m)   PainSc: 0-No pain   PainLoc: Back         Physical exam:  Gen: A and O x3, pleasant, well-groomed  Skin: No rashes or obvious lesions  HEENT: PERRLA, no obvious deformities on ears or in canals.Trachea midline.  CVS: Regular rate and rhythm, normal palpable pulses.  Resp: Clear to auscultation bilaterally, no wheezes or rales.  Abdomen: Soft, NT/ND.  Musculoskeletal: Able to heel walk, toe walk. No antalgic gait.     Incision site clean, dry, Steri-Strips in place.    Imagin2020 x-ray cervical spine  There are postsurgical changes of ACDF of C5 through C7.  Hardware is intact without fracture or obvious loosening.  There is solid bony fusion of C5 through C7.  There is marked disc space narrowing at the C4-5 and C7-T1 levels where there is endplate osteophyte formation.  The odontoid process is intact.  The facet joints maintain normal articulation.  There is mild anterior osteophyte formation also present at the C3-4 level.  There is bilateral multilevel bony foraminal stenosis due to changes of uncovertebral spurring and/or facet joint arthropathy.  This is worst at the C3-4 level bilaterally but also present at the C4-5 level on the left.  The prevertebral soft tissues are normal.  There is mild atherosclerosis of the right carotid bulb.  The included lung apices are clear.    2020 x-ray thoracolumbar spine  A neurostimulator appears to be present with 2 leads with the tips at approximately the T7-T8 level.  Osseous demineralization is noted diffusely.  A calcification appears to be present overlying the lower right renal shadow of 9 mm suggestive a stone.  A mild dextrocurvature is noted in the lumbar spine.  Intervertebral disc height loss noted " at the L3-L4 and L5-S1 levels.  Facet arthropathy is noted at the L3-L4, L4-L5, and L5-S1 levels.  A minimal anterolisthesis is noted of the L4 on L5 vertebral body of 5 mm.    Assessment:   The patient is a 63-year-old woman with a history of chronic cervical and lumbar DDD issues, status post cervical fusion and spinal cord stimulator for lumbar issues who presents in self-referral for continued neck and back pain.   1. Chronic pain disorder        2. DDD (degenerative disc disease), lumbar            Plan:  1. I removed her bandage and advise her that she can begin to shower but do not scrub the incision.  She can submerge in another 3 weeks.  She has excellent relief from her spinal cord stimulator and will follow-up as needed.

## 2024-03-12 ENCOUNTER — PATIENT MESSAGE (OUTPATIENT)
Dept: ORTHOPEDICS | Facility: CLINIC | Age: 64
End: 2024-03-12
Payer: OTHER GOVERNMENT

## 2024-03-14 DIAGNOSIS — M19.011 PRIMARY OSTEOARTHRITIS OF RIGHT SHOULDER: ICD-10-CM

## 2024-03-14 DIAGNOSIS — M25.511 RIGHT SHOULDER PAIN: ICD-10-CM

## 2024-03-14 DIAGNOSIS — M25.519 PAIN IN UNSPECIFIED SHOULDER: Primary | ICD-10-CM

## 2024-03-19 ENCOUNTER — PATIENT MESSAGE (OUTPATIENT)
Dept: ORTHOPEDICS | Facility: CLINIC | Age: 64
End: 2024-03-19
Payer: OTHER GOVERNMENT

## 2024-03-21 ENCOUNTER — PATIENT MESSAGE (OUTPATIENT)
Dept: ORTHOPEDICS | Facility: CLINIC | Age: 64
End: 2024-03-21
Payer: OTHER GOVERNMENT

## 2024-03-21 ENCOUNTER — HOSPITAL ENCOUNTER (OUTPATIENT)
Dept: RADIOLOGY | Facility: HOSPITAL | Age: 64
Discharge: HOME | End: 2024-03-21
Attending: ORTHOPAEDIC SURGERY
Payer: OTHER GOVERNMENT

## 2024-03-27 ENCOUNTER — HOSPITAL ENCOUNTER (OUTPATIENT)
Dept: RADIOLOGY | Facility: HOSPITAL | Age: 64
Discharge: HOME | End: 2024-03-27
Attending: ORTHOPAEDIC SURGERY
Payer: OTHER GOVERNMENT

## 2024-03-27 DIAGNOSIS — M25.511 RIGHT SHOULDER PAIN: ICD-10-CM

## 2024-03-27 DIAGNOSIS — M19.011 PRIMARY OSTEOARTHRITIS OF RIGHT SHOULDER: ICD-10-CM

## 2024-03-27 PROCEDURE — 73221 MRI JOINT UPR EXTREM W/O DYE: CPT | Mod: 26,RT,, | Performed by: RADIOLOGY

## 2024-03-27 PROCEDURE — 73221 MRI JOINT UPR EXTREM W/O DYE: CPT | Mod: TC,PO,RT

## 2024-04-03 ENCOUNTER — OFFICE VISIT (OUTPATIENT)
Dept: ORTHOPEDICS | Facility: CLINIC | Age: 64
End: 2024-04-03
Payer: OTHER GOVERNMENT

## 2024-04-03 VITALS — WEIGHT: 173 LBS | BODY MASS INDEX: 27.8 KG/M2 | HEIGHT: 66 IN

## 2024-04-03 DIAGNOSIS — M25.511 RIGHT SHOULDER PAIN, UNSPECIFIED CHRONICITY: Primary | ICD-10-CM

## 2024-04-03 PROCEDURE — 99215 OFFICE O/P EST HI 40 MIN: CPT | Mod: 57,S$PBB,, | Performed by: ORTHOPAEDIC SURGERY

## 2024-04-03 PROCEDURE — 99213 OFFICE O/P EST LOW 20 MIN: CPT | Mod: PBBFAC,PO | Performed by: ORTHOPAEDIC SURGERY

## 2024-04-03 PROCEDURE — 99999 PR PBB SHADOW E&M-EST. PATIENT-LVL III: CPT | Mod: PBBFAC,,, | Performed by: ORTHOPAEDIC SURGERY

## 2024-04-03 RX ORDER — SEMAGLUTIDE 0.68 MG/ML
0.5 INJECTION, SOLUTION SUBCUTANEOUS
COMMUNITY

## 2024-04-03 NOTE — PROGRESS NOTES
63 years old been dealing with pain in the right shoulder for about a year's time now despite nonoperative measures interested in more aggressive treatment     Exam shows cervical motion does not reproduce her symptoms, positive Neer Webster impingement sign weak and painful cuff strength     MRI shows signal changes undersurface supraspinatus tendon without retraction high-grade partial-thickness tearing signal changes in the superior labrum also some osseous edema in the posterior humeral head consistent with impingement type injury    Chronic right shoulder pain degenerative rotator cuff tendinosis partial tearing,   AC arthrosis     Plan:  patient has failed a nonoperative course interested in more aggressive treatment we will scheduled for shoulder arthroscopy with decompression possible cuff repair, she is aware of the risks and limitations of surgery and still wants to proceed

## 2024-04-12 DIAGNOSIS — M19.011 ARTHROSIS OF RIGHT ACROMIOCLAVICULAR JOINT: ICD-10-CM

## 2024-04-12 DIAGNOSIS — M25.511 RIGHT SHOULDER PAIN: Primary | ICD-10-CM

## 2024-04-12 DIAGNOSIS — M75.101 ROTATOR CUFF TEAR, RIGHT: ICD-10-CM

## 2024-04-12 RX ORDER — CEFAZOLIN SODIUM 2 G/50ML
2 SOLUTION INTRAVENOUS
Status: CANCELLED | OUTPATIENT
Start: 2024-04-12

## 2024-04-12 RX ORDER — MUPIROCIN 20 MG/G
OINTMENT TOPICAL
Status: CANCELLED | OUTPATIENT
Start: 2024-04-12

## 2024-05-20 ENCOUNTER — PATIENT MESSAGE (OUTPATIENT)
Dept: ORTHOPEDICS | Facility: CLINIC | Age: 64
End: 2024-05-20
Payer: OTHER GOVERNMENT

## 2024-05-20 ENCOUNTER — ANESTHESIA EVENT (OUTPATIENT)
Dept: SURGERY | Facility: HOSPITAL | Age: 64
End: 2024-05-20
Payer: OTHER GOVERNMENT

## 2024-05-20 DIAGNOSIS — M25.511 ACUTE PAIN OF RIGHT SHOULDER: Primary | ICD-10-CM

## 2024-05-20 RX ORDER — OXYCODONE AND ACETAMINOPHEN 5; 325 MG/1; MG/1
1 TABLET ORAL
Qty: 32 TABLET | Refills: 0 | Status: SHIPPED | OUTPATIENT
Start: 2024-05-20

## 2024-05-21 ENCOUNTER — ANESTHESIA (OUTPATIENT)
Dept: SURGERY | Facility: HOSPITAL | Age: 64
End: 2024-05-21
Payer: OTHER GOVERNMENT

## 2024-05-21 ENCOUNTER — TELEPHONE (OUTPATIENT)
Dept: ORTHOPEDICS | Facility: CLINIC | Age: 64
End: 2024-05-21
Payer: OTHER GOVERNMENT

## 2024-05-21 ENCOUNTER — HOSPITAL ENCOUNTER (OUTPATIENT)
Facility: HOSPITAL | Age: 64
Discharge: HOME | End: 2024-05-21
Attending: ORTHOPAEDIC SURGERY | Admitting: ORTHOPAEDIC SURGERY
Payer: OTHER GOVERNMENT

## 2024-05-21 DIAGNOSIS — M19.011 ARTHROSIS OF RIGHT ACROMIOCLAVICULAR JOINT: ICD-10-CM

## 2024-05-21 DIAGNOSIS — M75.101 ROTATOR CUFF TEAR, RIGHT: ICD-10-CM

## 2024-05-21 DIAGNOSIS — M25.511 RIGHT SHOULDER PAIN: ICD-10-CM

## 2024-05-21 PROCEDURE — 36000710: Mod: PO | Performed by: ORTHOPAEDIC SURGERY

## 2024-05-21 PROCEDURE — D9220A PRA ANESTHESIA: Mod: ANES,,, | Performed by: ANESTHESIOLOGY

## 2024-05-21 PROCEDURE — D9220A PRA ANESTHESIA: Mod: CRNA,,, | Performed by: NURSE ANESTHETIST, CERTIFIED REGISTERED

## 2024-05-21 PROCEDURE — 37000008 HC ANESTHESIA 1ST 15 MINUTES: Mod: PO | Performed by: ORTHOPAEDIC SURGERY

## 2024-05-21 PROCEDURE — 27201423 OPTIME MED/SURG SUP & DEVICES STERILE SUPPLY: Mod: PO | Performed by: ORTHOPAEDIC SURGERY

## 2024-05-21 PROCEDURE — 25000003 PHARM REV CODE 250: Mod: PO | Performed by: NURSE ANESTHETIST, CERTIFIED REGISTERED

## 2024-05-21 PROCEDURE — 63600175 PHARM REV CODE 636 W HCPCS: Mod: PO | Performed by: ANESTHESIOLOGY

## 2024-05-21 PROCEDURE — 63600175 PHARM REV CODE 636 W HCPCS: Mod: PO | Performed by: ORTHOPAEDIC SURGERY

## 2024-05-21 PROCEDURE — 71000015 HC POSTOP RECOV 1ST HR: Mod: PO | Performed by: ORTHOPAEDIC SURGERY

## 2024-05-21 PROCEDURE — C9290 INJ, BUPIVACAINE LIPOSOME: HCPCS | Mod: PO | Performed by: ANESTHESIOLOGY

## 2024-05-21 PROCEDURE — 25000003 PHARM REV CODE 250: Mod: PO | Performed by: ANESTHESIOLOGY

## 2024-05-21 PROCEDURE — 29823 SHO ARTHRS SRG XTNSV DBRDMT: CPT | Mod: 51,RT,, | Performed by: ORTHOPAEDIC SURGERY

## 2024-05-21 PROCEDURE — 63600175 PHARM REV CODE 636 W HCPCS: Mod: PO | Performed by: NURSE ANESTHETIST, CERTIFIED REGISTERED

## 2024-05-21 PROCEDURE — 64415 NJX AA&/STRD BRCH PLXS IMG: CPT | Mod: PO | Performed by: ANESTHESIOLOGY

## 2024-05-21 PROCEDURE — 20924 REMOVAL OF TENDON FOR GRAFT: CPT | Mod: RT,,, | Performed by: ORTHOPAEDIC SURGERY

## 2024-05-21 PROCEDURE — 37000009 HC ANESTHESIA EA ADD 15 MINS: Mod: PO | Performed by: ORTHOPAEDIC SURGERY

## 2024-05-21 PROCEDURE — 25000003 PHARM REV CODE 250: Mod: PO | Performed by: ORTHOPAEDIC SURGERY

## 2024-05-21 PROCEDURE — 36000711: Mod: PO | Performed by: ORTHOPAEDIC SURGERY

## 2024-05-21 PROCEDURE — 71000033 HC RECOVERY, INTIAL HOUR: Mod: PO | Performed by: ORTHOPAEDIC SURGERY

## 2024-05-21 RX ORDER — KETOROLAC TROMETHAMINE 30 MG/ML
INJECTION, SOLUTION INTRAMUSCULAR; INTRAVENOUS
Status: DISCONTINUED | OUTPATIENT
Start: 2024-05-21 | End: 2024-05-21

## 2024-05-21 RX ORDER — MIDAZOLAM HYDROCHLORIDE 1 MG/ML
0.5 INJECTION, SOLUTION INTRAMUSCULAR; INTRAVENOUS
Status: DISCONTINUED | OUTPATIENT
Start: 2024-05-21 | End: 2024-05-21 | Stop reason: HOSPADM

## 2024-05-21 RX ORDER — LIDOCAINE HYDROCHLORIDE 20 MG/ML
INJECTION INTRAVENOUS
Status: DISCONTINUED | OUTPATIENT
Start: 2024-05-21 | End: 2024-05-21

## 2024-05-21 RX ORDER — LIDOCAINE HYDROCHLORIDE 10 MG/ML
1 INJECTION, SOLUTION EPIDURAL; INFILTRATION; INTRACAUDAL; PERINEURAL ONCE
Status: DISCONTINUED | OUTPATIENT
Start: 2024-05-21 | End: 2024-05-21 | Stop reason: HOSPADM

## 2024-05-21 RX ORDER — DEXAMETHASONE SODIUM PHOSPHATE 4 MG/ML
8 INJECTION, SOLUTION INTRA-ARTICULAR; INTRALESIONAL; INTRAMUSCULAR; INTRAVENOUS; SOFT TISSUE
Status: COMPLETED | OUTPATIENT
Start: 2024-05-21 | End: 2024-05-21

## 2024-05-21 RX ORDER — CEFAZOLIN SODIUM 2 G/50ML
2 SOLUTION INTRAVENOUS
Status: COMPLETED | OUTPATIENT
Start: 2024-05-21 | End: 2024-05-21

## 2024-05-21 RX ORDER — FENTANYL CITRATE 50 UG/ML
25 INJECTION, SOLUTION INTRAMUSCULAR; INTRAVENOUS EVERY 5 MIN PRN
Status: DISCONTINUED | OUTPATIENT
Start: 2024-05-21 | End: 2024-05-21 | Stop reason: HOSPADM

## 2024-05-21 RX ORDER — PROPOFOL 10 MG/ML
VIAL (ML) INTRAVENOUS
Status: DISCONTINUED | OUTPATIENT
Start: 2024-05-21 | End: 2024-05-21

## 2024-05-21 RX ORDER — HYDROMORPHONE HYDROCHLORIDE 2 MG/ML
0.2 INJECTION, SOLUTION INTRAMUSCULAR; INTRAVENOUS; SUBCUTANEOUS EVERY 5 MIN PRN
Status: DISCONTINUED | OUTPATIENT
Start: 2024-05-21 | End: 2024-05-21 | Stop reason: HOSPADM

## 2024-05-21 RX ORDER — SODIUM CHLORIDE, SODIUM LACTATE, POTASSIUM CHLORIDE, CALCIUM CHLORIDE 600; 310; 30; 20 MG/100ML; MG/100ML; MG/100ML; MG/100ML
INJECTION, SOLUTION INTRAVENOUS CONTINUOUS
Status: DISCONTINUED | OUTPATIENT
Start: 2024-05-21 | End: 2024-05-21 | Stop reason: HOSPADM

## 2024-05-21 RX ORDER — DEXAMETHASONE SODIUM PHOSPHATE 4 MG/ML
INJECTION, SOLUTION INTRA-ARTICULAR; INTRALESIONAL; INTRAMUSCULAR; INTRAVENOUS; SOFT TISSUE
Status: DISCONTINUED | OUTPATIENT
Start: 2024-05-21 | End: 2024-05-21

## 2024-05-21 RX ORDER — OXYCODONE HYDROCHLORIDE 5 MG/1
5 TABLET ORAL
Status: DISCONTINUED | OUTPATIENT
Start: 2024-05-21 | End: 2024-05-21 | Stop reason: HOSPADM

## 2024-05-21 RX ORDER — ROCURONIUM BROMIDE 10 MG/ML
INJECTION, SOLUTION INTRAVENOUS
Status: DISCONTINUED | OUTPATIENT
Start: 2024-05-21 | End: 2024-05-21

## 2024-05-21 RX ORDER — ACETAMINOPHEN 10 MG/ML
INJECTION, SOLUTION INTRAVENOUS
Status: DISCONTINUED | OUTPATIENT
Start: 2024-05-21 | End: 2024-05-21

## 2024-05-21 RX ORDER — ONDANSETRON HYDROCHLORIDE 2 MG/ML
INJECTION, SOLUTION INTRAVENOUS
Status: DISCONTINUED | OUTPATIENT
Start: 2024-05-21 | End: 2024-05-21

## 2024-05-21 RX ORDER — MUPIROCIN 20 MG/G
OINTMENT TOPICAL
Status: DISCONTINUED | OUTPATIENT
Start: 2024-05-21 | End: 2024-05-21 | Stop reason: HOSPADM

## 2024-05-21 RX ORDER — MIDAZOLAM HYDROCHLORIDE 1 MG/ML
INJECTION INTRAMUSCULAR; INTRAVENOUS
Status: DISCONTINUED | OUTPATIENT
Start: 2024-05-21 | End: 2024-05-21

## 2024-05-21 RX ORDER — BUPIVACAINE HYDROCHLORIDE 5 MG/ML
INJECTION, SOLUTION EPIDURAL; INTRACAUDAL
Status: COMPLETED | OUTPATIENT
Start: 2024-05-21 | End: 2024-05-21

## 2024-05-21 RX ORDER — FENTANYL CITRATE 50 UG/ML
INJECTION, SOLUTION INTRAMUSCULAR; INTRAVENOUS
Status: DISCONTINUED | OUTPATIENT
Start: 2024-05-21 | End: 2024-05-21

## 2024-05-21 RX ADMIN — MIDAZOLAM 2 MG: 1 INJECTION INTRAMUSCULAR; INTRAVENOUS at 11:05

## 2024-05-21 RX ADMIN — LIDOCAINE HYDROCHLORIDE 75 MG: 20 INJECTION INTRAVENOUS at 11:05

## 2024-05-21 RX ADMIN — ONDANSETRON 4 MG: 2 INJECTION, SOLUTION INTRAMUSCULAR; INTRAVENOUS at 11:05

## 2024-05-21 RX ADMIN — SUGAMMADEX 200 MG: 100 INJECTION, SOLUTION INTRAVENOUS at 12:05

## 2024-05-21 RX ADMIN — FENTANYL CITRATE 50 MCG: 50 INJECTION, SOLUTION INTRAMUSCULAR; INTRAVENOUS at 11:05

## 2024-05-21 RX ADMIN — MUPIROCIN: 20 OINTMENT TOPICAL at 10:05

## 2024-05-21 RX ADMIN — BUPIVACAINE 10 ML: 13.3 INJECTION, SUSPENSION, LIPOSOMAL INFILTRATION at 11:05

## 2024-05-21 RX ADMIN — DEXAMETHASONE SODIUM PHOSPHATE 4 MG: 4 INJECTION, SOLUTION INTRAMUSCULAR; INTRAVENOUS at 11:05

## 2024-05-21 RX ADMIN — ROCURONIUM BROMIDE 30 MG: 10 INJECTION, SOLUTION INTRAVENOUS at 11:05

## 2024-05-21 RX ADMIN — SODIUM CHLORIDE, POTASSIUM CHLORIDE, SODIUM LACTATE AND CALCIUM CHLORIDE: 600; 310; 30; 20 INJECTION, SOLUTION INTRAVENOUS at 12:05

## 2024-05-21 RX ADMIN — MIDAZOLAM HYDROCHLORIDE 2 MG: 2 INJECTION, SOLUTION INTRAMUSCULAR; INTRAVENOUS at 11:05

## 2024-05-21 RX ADMIN — CEFAZOLIN SODIUM 2 G: 2 SOLUTION INTRAVENOUS at 11:05

## 2024-05-21 RX ADMIN — ACETAMINOPHEN 1000 MG: 10 INJECTION, SOLUTION INTRAVENOUS at 11:05

## 2024-05-21 RX ADMIN — FENTANYL CITRATE 100 MCG: 50 INJECTION INTRAMUSCULAR; INTRAVENOUS at 11:05

## 2024-05-21 RX ADMIN — KETOROLAC TROMETHAMINE 30 MG: 30 INJECTION, SOLUTION INTRAMUSCULAR at 11:05

## 2024-05-21 RX ADMIN — PROPOFOL 150 MG: 10 INJECTION, EMULSION INTRAVENOUS at 11:05

## 2024-05-21 RX ADMIN — FENTANYL CITRATE 50 MCG: 50 INJECTION, SOLUTION INTRAMUSCULAR; INTRAVENOUS at 12:05

## 2024-05-21 RX ADMIN — DEXAMETHASONE SODIUM PHOSPHATE 8 MG: 4 INJECTION INTRA-ARTICULAR; INTRALESIONAL; INTRAMUSCULAR; INTRAVENOUS; SOFT TISSUE at 10:05

## 2024-05-21 RX ADMIN — OXYCODONE 5 MG: 5 TABLET ORAL at 12:05

## 2024-05-21 RX ADMIN — SODIUM CHLORIDE, POTASSIUM CHLORIDE, SODIUM LACTATE AND CALCIUM CHLORIDE: 600; 310; 30; 20 INJECTION, SOLUTION INTRAVENOUS at 10:05

## 2024-05-21 RX ADMIN — BUPIVACAINE HYDROCHLORIDE 5 ML: 5 INJECTION, SOLUTION EPIDURAL; INTRACAUDAL; PERINEURAL at 11:05

## 2024-05-21 NOTE — TRANSFER OF CARE
"Anesthesia Transfer of Care Note    Patient: Bria Nathan    Procedure(s) Performed: Procedure(s) (LRB):  ARTHROSCOPY, SHOULDER, WITH SUBACROMIAL SPACE DECOMPRESSION (Right)  REPAIR, ROTATOR CUFF, ARTHROSCOPIC (Right)    Patient location: PACU    Anesthesia Type: general    Transport from OR: Transported from OR on room air with adequate spontaneous ventilation    Post pain: adequate analgesia    Post assessment: no apparent anesthetic complications and tolerated procedure well    Level of consciousness: responds to stimulation    Nausea/Vomiting: no nausea/vomiting    Complications: none    Transfer of care protocol was followed      Last vitals: Visit Vitals  /62 (BP Location: Left arm, Patient Position: Lying)   Pulse 84   Temp 36.8 °C (98.2 °F) (Skin)   Resp 16   Ht 5' 6" (1.676 m)   Wt 78.5 kg (173 lb)   SpO2 98%   Breastfeeding No   BMI 27.92 kg/m²     "

## 2024-05-21 NOTE — ANESTHESIA PREPROCEDURE EVALUATION
05/21/2024  Bria Nathan is a 63 y.o., female.      Pre-op Assessment          Review of Systems  Cardiovascular:     Hypertension                                  Hypertension         Musculoskeletal:  Arthritis        Arthritis          Neurological:    Neuromuscular Disease,           Arthritis                         Neuromuscular Disease   Psych:  Psychiatric History                  Physical Exam  General: Well nourished        Anesthesia Plan  Type of Anesthesia, risks & benefits discussed:    Anesthesia Type: Gen ETT  Intra-op Monitoring Plan: Standard ASA Monitors  Post Op Pain Control Plan: multimodal analgesia, peripheral nerve block and IV/PO Opioids PRN  Induction:  IV  Airway Plan: Video  Informed Consent: Informed consent signed with the Patient and all parties understand the risks and agree with anesthesia plan.  All questions answered.   ASA Score: 2  Day of Surgery Review of History & Physical: H&P Update referred to the surgeon/provider.    Ready For Surgery From Anesthesia Perspective.     .

## 2024-05-21 NOTE — PLAN OF CARE
Right interscalene single shot block complete per Dr. Kaur with Anesthesia. Exparel band placed to pt's wrist. Pt tolerated well. See Anesthesia record for procedural notes. See flowsheet and MAR for vitals and medications. Monitors in place, alarms audible. VSS. etCO2 monitoring in place. Resp even, unlabored. Skin warm, dry. Pt in NAD. Pt awaiting transport to OR. Family at bedside. Bed in lowest, locked position. Side rails up x2. Call light within reach.

## 2024-05-21 NOTE — ANESTHESIA POSTPROCEDURE EVALUATION
Anesthesia Post Evaluation    Patient: Bria Nathan    Procedure(s) Performed: Procedure(s) (LRB):  ARTHROSCOPY, SHOULDER, WITH SUBACROMIAL SPACE DECOMPRESSION (Right)  DEBRIDEMENT, SHOULDER, ARTHROSCOPIC (Right)  EXCISION, CLAVICLE, DISTAL (Right)    Final Anesthesia Type: general      Patient location during evaluation: PACU  Patient participation: Yes- Able to Participate  Level of consciousness: awake and alert  Post-procedure vital signs: reviewed and stable  Pain management: adequate  Airway patency: patent    PONV status at discharge: No PONV  Anesthetic complications: no      Cardiovascular status: blood pressure returned to baseline  Respiratory status: unassisted and room air  Hydration status: euvolemic  Follow-up not needed.              Vitals Value Taken Time   /56 05/21/24 1300   Temp 36.4 °C (97.5 °F) 05/21/24 1230   Pulse 88 05/21/24 1300   Resp 16 05/21/24 1300   SpO2 96 % 05/21/24 1300         Event Time   Out of Recovery 13:09:00         Pain/Wellington Score: Pain Rating Prior to Med Admin: 2 (5/21/2024 12:55 PM)  Pain Rating Post Med Admin: 2 (5/21/2024  1:00 PM)  Wellington Score: 10 (5/21/2024  1:00 PM)

## 2024-05-21 NOTE — ANESTHESIA PROCEDURE NOTES
Intubation    Date/Time: 5/21/2024 11:37 AM    Performed by: Valentin Mayers CRNA  Authorized by: Raquel Kaur MD    Intubation:     Induction:  Intravenous    Intubated:  Postinduction    Mask Ventilation:  Easy mask    Attempts:  1    Attempted By:  CRNA    Method of Intubation:  Video laryngoscopy    Blade:  Robles 3    Laryngeal View Grade: Grade I - full view of cords      Difficult Airway Encountered?: No      Complications:  None    Airway Device:  Oral endotracheal tube    Airway Device Size:  7.0    Style/Cuff Inflation:  Cuffed    Inflation Amount (mL):  5    Tube secured:  21    Secured at:  The lips    Placement Verified By:  Capnometry    Complicating Factors:  None    Findings Post-Intubation:  BS equal bilateral and atraumatic/condition of teeth unchanged

## 2024-05-21 NOTE — TELEPHONE ENCOUNTER
Contacted patient. [facility] stated [facility] to get [facility]. Pre-service is working on it as well. In communication with Noris Peguero. Patient verbalized understanding.

## 2024-05-21 NOTE — DISCHARGE INSTRUCTIONS
Shoulder Scope/Rotator Cuff    After surgery:    DO:   Minimal activity for the first 48 hours.  Advance diet as tolerated.    Keep operative site clean and dry for 3 days.   Remove bandages in 3 days. Then you may shower. Pat incisions dry, and place band-aids over surgical site. Reapply sling.   Wear sling at all times until block wears off.   Apply ice to shoulder for 20-30 minute intervals for next 3 days. Only apply ice while you are awake.    Move fingers and check circulation by pressing on nails. Color should be white to pink.   Resume all home medications.    DO NOT:   Do not soak in tub or pool until cleared by your physician.   Do not drive for 24 hours or while taking narcotic pain medication.  Do not take additional tylenol/acetaminophen while taking narcotic pain medication that contains tylenol/acetaminophen.     CALL DOCTOR FOR:  Signs of infection, such as redness, increased swelling, or drainage around the operative site.  Fever greater than 101.     Call your physician at [phone] for questions.     Exparel(bupivacaine) has been injected to provide approximately 72 hours of reduced pain after your surgery.  Do not remove the bracelet for five days.  Report to your doctor as soon as possible if you experience any of the following:   Restlessness   Anxiety   Speech problems    Lightheadedness   Numbness and tingling of the mouth and lips   Seizures    Metallic taste   Blurred vision   Tremors    Twitching   Depression   Extreme drowsiness  Avoid additional use of local anesthetics (such as dental procedures) for five days (96 hours).     Discharge Instructions: After Your Surgery/Procedure  Youve just had surgery. During surgery you were given medicine called anesthesia to keep you relaxed and free of pain. After surgery you may have some pain or nausea. This is common. Here are some tips for feeling better and getting well after surgery.     Stay on schedule with your medication.   Going  "home  Your doctor or nurse will show you how to take care of yourself when you go home. He or she will also answer your questions. Have an adult family member or friend drive you home.      For your safety we recommend these precaution for the first 24 hours after your procedure:  Do not drive or use heavy equipment.  Do not make important decisions or sign legal papers.  Do not drink alcohol.  Have someone stay with you, if needed. He or she can watch for problems and help keep you safe.  Your concentration, balance, coordination, and judgement may be impaired for many hours after anesthesia.  Use caution when ambulating or standing up.     You may feel weak and "washed out" after anesthesia and surgery.      Subtle residual effects of general anesthesia or sedation with regional / local anesthesia can last more than 24 hours.  Rest for the remainder of the day or longer if your Doctor/Surgeon has advised you to do so.  Although you may feel normal within the first 24 hours, your reflexes and mental ability may be impaired without you realizing it.  You may feel dizzy, lightheaded or sleepy for 24 hours or longer.      Be sure to go to all follow-up visits with your doctor. And rest after your surgery for as long as your doctor tells you to.  Coping with pain  If you have pain after surgery, pain medicine will help you feel better. Take it as told, before pain becomes severe. Also, ask your doctor or pharmacist about other ways to control pain. This might be with heat, ice, or relaxation. And follow any other instructions your surgeon or nurse gives you.  Tips for taking pain medicine  To get the best relief possible, remember these points:  Pain medicines can upset your stomach. Taking them with a little food may help.  Most pain relievers taken by mouth need at least 20 to 30 minutes to start to work.  Taking medicine on a schedule can help you remember to take it. Try to time your medicine so that you can take " it before starting an activity. This might be before you get dressed, go for a walk, or sit down for dinner.  Constipation is a common side effect of pain medicines. Call your doctor before taking any medicines such as laxatives or stool softeners to help ease constipation. Also ask if you should skip any foods. Drinking lots of fluids and eating foods such as fruits and vegetables that are high in fiber can also help. Remember, do not take laxatives unless your surgeon has prescribed them.  Drinking alcohol and taking pain medicine can cause dizziness and slow your breathing. It can even be deadly. Do not drink alcohol while taking pain medicine.  Pain medicine can make you react more slowly to things. Do not drive or run machinery while taking pain medicine.  Your health care provider may tell you to take acetaminophen to help ease your pain. Ask him or her how much you are supposed to take each day. Acetaminophen or other pain relievers may interact with your prescription medicines or other over-the-counter (OTC) drugs. Some prescription medicines have acetaminophen and other ingredients. Using both prescription and OTC acetaminophen for pain can cause you to overdose. Read the labels on your OTC medicines with care. This will help you to clearly know the list of ingredients, how much to take, and any warnings. It may also help you not take too much acetaminophen. If you have questions or do not understand the information, ask your pharmacist or health care provider to explain it to you before you take the OTC medicine.  Managing nausea  Some people have an upset stomach after surgery. This is often because of anesthesia, pain, or pain medicine, or the stress of surgery. These tips will help you handle nausea and eat healthy foods as you get better. If you were on a special food plan before surgery, ask your doctor if you should follow it while you get better. These tips may help:  Do not push yourself to eat.  Your body will tell you when to eat and how much.  Start off with clear liquids and soup. They are easier to digest.  Next try semi-solid foods, such as mashed potatoes, applesauce, and gelatin, as you feel ready.  Slowly move to solid foods. Dont eat fatty, rich, or spicy foods at first.  Do not force yourself to have 3 large meals a day. Instead eat smaller amounts more often.  Take pain medicines with a small amount of solid food, such as crackers or toast, to avoid nausea.     Call your surgeon if  You still have pain an hour after taking medicine. The medicine may not be strong enough.  You feel too sleepy, dizzy, or groggy. The medicine may be too strong.  You have side effects like nausea, vomiting, or skin changes, such as rash, itching, or hives.       If you have obstructive sleep apnea  You were given anesthesia medicine during surgery to keep you comfortable and free of pain. After surgery, you may have more apnea spells because of this medicine and other medicines you were given. The spells may last longer than usual.   At home:  Keep using the continuous positive airway pressure (CPAP) device when you sleep. Unless your health care provider tells you not to, use it when you sleep, day or night. CPAP is a common device used to treat obstructive sleep apnea.  Talk with your provider before taking any pain medicine, muscle relaxants, or sedatives. Your provider will tell you about the possible dangers of taking these medicines.  © 2715-0752 [employer]. [address] [place] [address], [place] [address]. All rights reserved. This information is not intended as a substitute for professional medical care. Always follow your healthcare professional's instructions.

## 2024-05-21 NOTE — H&P
63 years old been dealing with pain in the right shoulder for about a year's time now despite nonoperative measures interested in more aggressive treatment      Exam shows cervical motion does not reproduce her symptoms, positive Neer Webster impingement sign weak and painful cuff strength      MRI shows signal changes undersurface supraspinatus tendon without retraction high-grade partial-thickness tearing signal changes in the superior labrum also some osseous edema in the posterior humeral head consistent with impingement type injury     Chronic right shoulder pain degenerative rotator cuff tendinosis partial tearing,   AC arthrosis      Plan:  patient has failed a nonoperative course interested in more aggressive treatment we will scheduled for shoulder arthroscopy with decompression possible cuff repair, she is aware of the risks and limitations of surgery and still wants to proceed       Imaging studies ordered and reviewed by me    Further History  Aching pain  Worse with activity  Relieved with rest  No other associated symptoms  No other radiation    Further Exam  Alert and oriented  Pleasant  Contralateral limb has appropriate range of motion for age and condition  Contralateral limb has appropriate strength for age and condition  Contralateral limb has appropriate stability  for age and condition  No adenopathy  Pulses are appropriate for current condition  Skin is intact        Chief Complaint    No chief complaint on file.      HPI  Bria Nathan is a 63 y.o.  female who presents with       Past Medical History  Past Medical History:   Diagnosis Date    Arthritis     Hypertension     Insomnia     PONV (postoperative nausea and vomiting)     Restless leg syndrome        Past Surgical History  Past Surgical History:   Procedure Laterality Date    ACDF  2008    APPENDECTOMY      COLONOSCOPY  2011    normal findings per patient report    COLONOSCOPY N/A 11/12/2021    Procedure: COLONOSCOPY;   Surgeon: Valentin Espinoza MD;  Location: [facility];  Service: Endoscopy;  Laterality: N/A;    ESOPHAGOGASTRODUODENOSCOPY N/A 11/12/2021    Procedure: EGD (ESOPHAGOGASTRODUODENOSCOPY);  Surgeon: Valentin Espinoza MD;  Location: [facility];  Service: Endoscopy;  Laterality: N/A;    HYSTERECTOMY      INJECTION OF ANESTHETIC AGENT AROUND MEDIAL BRANCH NERVES INNERVATING LUMBAR FACET JOINT Bilateral 3/31/2023    Procedure: Block-nerve-medial branch-lumbar L4/5 AND L5/S1;  Surgeon: Gavino Rodríguez MD;  Location: [facility];  Service: Pain Management;  Laterality: Bilateral;  L4/5 and L5/s1 ( pt would like to be last case please)    INJECTION OF ANESTHETIC AGENT AROUND MEDIAL BRANCH NERVES INNERVATING LUMBAR FACET JOINT Bilateral 5/5/2023    Procedure: Block-nerve-medial branch-lumbar L4/5, L5/S1;  Surgeon: Gavino Rodríguez MD;  Location: [facility];  Service: Pain Management;  Laterality: Bilateral;    RADIOFREQUENCY ABLATION OF LUMBAR MEDIAL BRANCH NERVE AT SINGLE LEVEL Bilateral 6/20/2023    Procedure: Radiofrequency Ablation, Nerve, Spinal, Lumbar, Medial Branch, L4/5, L5/S1;  Surgeon: Gavino Rodríguez MD;  Location: [facility];  Service: Pain Management;  Laterality: Bilateral;    RADIOFREQUENCY THERMAL COAGULATION OF MEDIAL BRANCH OF POSTERIOR RAMUS OF CERVICAL SPINAL NERVE Left 3/11/2021    Procedure: RADIOFREQUENCY THERMAL COAGULATION, NERVE, SPINAL, CERVICAL C3,4,5,6;  Surgeon: Gavino Rodríguez MD;  Location: [facility];  Service: Pain Management;  Laterality: Left;    REPLACEMENT OF NERVE STIMULATOR BATTERY N/A 10/20/2023    Procedure: Replacement, Battery, Neurostimulator  IPG EXCHANGE;  Surgeon: Gavino Rodríguez MD;  Location: [facility];  Service: Pain Management;  Laterality: N/A;    SPINAL CORD STIMULATOR IMPLANT      UPPER GASTROINTESTINAL ENDOSCOPY  2011    normal findings per patient report, negative for h pylori       Medications  Current Facility-Administered Medications   Medication    cefazolin  (ANCEF) 2 gram in dextrose 5% 50 mL IVPB (premix)    dexAMETHasone injection 8 mg    fentaNYL 50 mcg/mL injection 25 mcg    lactated ringers infusion    LIDOcaine (PF) 10 mg/ml (1%) injection 10 mg    midazolam injection 0.5 mg    mupirocin 2 % ointment     Facility-Administered Medications Ordered in Other Encounters   Medication    diphenhydrAMINE injection 12.5 mg    electrolyte-S (ISOLYTE)    fentaNYL 50 mcg/mL injection 25 mcg    HYDROmorphone (PF) injection 0.2 mg    lactated ringers infusion    LIDOcaine (PF) 10 mg/ml (1%) injection 10 mg    oxyCODONE immediate release tablet 5 mg       Allergies  Review of patient's allergies indicates:  No Known Allergies    Family History  Family History   Problem Relation Name Age of Onset    Stomach cancer Father          h pylori associated    Colon cancer Neg Hx      Crohn's disease Neg Hx      Ulcerative colitis Neg Hx      Celiac disease Neg Hx         Social History  Social History     Socioeconomic History    Marital status:    Tobacco Use    Smoking status: Every Day     Current packs/day: 1.00     Average packs/day: 1 pack/day for 20.0 years (20.0 ttl pk-yrs)     Types: Cigarettes    Smokeless tobacco: Never   Substance and Sexual Activity    Alcohol use: Yes     Alcohol/week: 4.0 - 5.0 standard drinks of alcohol     Types: 3 - 4 Cans of beer, 1 Shots of liquor per week     Comment: over a week    Drug use: Not Currently     Types: Other-see comments, Oxycodone     Comment: ultram               Review of Systems     Constitutional: Negative    HENT: Negative  Eyes: Negative  Respiratory: Negative  Cardiovascular: Negative  Musculoskeletal: HPI  Skin: Negative  Neurological: Negative  Hematological: Negative  Endocrine: Negative                 Physical Exam    Vitals:    05/21/24 1038   BP: 133/60   Pulse: 98   Resp: 18   Temp: 98.2 °F (36.8 °C)     Body mass index is 27.92 kg/m².  Physical Examination:     General appearance -  well appearing, and in no  distress  Mental status - awake  Neck - supple  Chest -  symmetric air entry  Heart - normal rate   Abdomen - soft      Assessment     1. Right shoulder pain    2. Arthrosis of right acromioclavicular joint    3. Rotator cuff tear, right          Plan

## 2024-05-21 NOTE — OP NOTE
[facility] - Surgery  Operative Note    SUMMARY     Date of Procedure: 5/21/2024     Pre-Operative Diagnosis: Right shoulder pain [M25.511]  Arthrosis of right acromioclavicular joint [M19.011]  Rotator cuff tear, right [M75.101]    Post-Operative Diagnosis:  Right shoulder pain, glenohumeral arthrosis, right shoulder impingement, partial rotator cuff tear, biceps tendinitis, AC arthrosis    Procedure:  Right shoulder arthroscopy with debridement of shoulder joint, subacromial decompression with acromioplasty, distal clavicle excision    Surgeons and Role:     * Simone Alfredo MD - Primary  Assistant Bryon  Indications for procedure:      Procedure in Detail:  After obtaining consent and starting the patient on preoperative IV antibiotics, patient taken back to the operating room where general anesthesia was performed by anesthesia team.  Patient positioned in the beach chair position stabilized with a captain's chair The right upper extremity and shoulder were prepped and draped in normal sterile fashion.  Standard posterior portal was established arthroscope introduced into the shoulder joint we then established an anterior portal inferior to the biceps tendon the biceps was intact but there was some fraying at its origin also some fraying of the labrum anteriorly and superiorly we introduced a shaver to the anterior portal performed a limited debridement of this bicipital labral complex.  We did note at the time that there was grade 4 chondrosis on the humeral head centrally that measured about 6 x 6 mm we debrided this as well with a shaver.  No loose bodies in the axillary recess.  We then got a good look at the cuff from below there was some fraying but no full-thickness tearing.  Then removed the arthroscope from the shoulder joint placed it into the subacromial space and established a lateral portal through the lateral portal we performed our decompression taking down the bursa with a shaver blade got a  good look at the cuff from above no full-thickness penetration of the rotator cuff we then went ahead and performed our acromioplasty with a bur taken down the anterior inferior aspect of the acromion and finally through the anterior portal we performed a distal clavicle excision taking about 5 mm of the distal clavicle with a bur at this point were satisfied with the decompression we evacuated fluid removed arthroscopic instruments closed portal sites with nylon stitches sterile dressing applied this concluded the operative procedure    Anesthesia: General    Complications: No    Estimated Blood Loss (EBL): * No values recorded between 5/21/2024 11:55 AM and 5/21/2024 12:18 PM *           Implants: * No implants in log *    Specimens:   Specimen (24h ago, onward)      None

## 2024-05-21 NOTE — TELEPHONE ENCOUNTER
----- Message from Stephan Masterson sent at 5/21/2024  8:48 AM CDT -----  Name of Who is Calling:EVETTE CLINTON [[id number]]        What is the request in detail:Pt would like a callback from Dionne in the office in regards to authorization for procedure on today.Please advise thank you       Can the clinic reply by [facility]:NO        What Number to Call Back if not in [facility]:[phone]

## 2024-05-21 NOTE — PLAN OF CARE
Pt tolerated procedure well . Minimal pain no nausea  Tolerates fluids well. Ambulates.  Polar care ice machine sent home with pt-discharge instructions reviewed with pt and spouse.

## 2024-05-22 VITALS
OXYGEN SATURATION: 98 % | DIASTOLIC BLOOD PRESSURE: 61 MMHG | HEIGHT: 66 IN | TEMPERATURE: 98 F | HEART RATE: 85 BPM | BODY MASS INDEX: 27.8 KG/M2 | SYSTOLIC BLOOD PRESSURE: 123 MMHG | RESPIRATION RATE: 16 BRPM | WEIGHT: 173 LBS

## 2024-05-22 NOTE — DISCHARGE SUMMARY
Discharge Note  Short Stay      SUMMARY     Admit Date: 5/21/2024    Attending Physician: No att. providers found     Discharge Physician: No att. providers found    Discharge Date: 5/22/2024 10:22 AM    Final Diagnosis: Right shoulder pain [M25.511]  Arthrosis of right acromioclavicular joint [M19.011]  Rotator cuff tear, right [M75.101]    Hospital Course: Patient tolerated procedure well.     Disposition: Home or Self Care    Patient Instructions:   Discharge Medication List as of 5/21/2024 12:47 PM        CONTINUE these medications which have NOT CHANGED    Details   amLODIPine (NORVASC) 10 MG tablet Take 10 mg by mouth every evening., Starting Mon 1/23/2023, Historical Med      atorvastatin (LIPITOR) 80 MG tablet Take 80 mg by mouth once daily., Historical Med      cholecalciferol, vitamin D3, (VITAMIN D3) 50 mcg (2,000 unit) Tab TAKE ONE TABLET BY MOUTH EVERY DAY AS A VITAMIN SUPPLEMENT, Historical Med      DULoxetine (CYMBALTA) 60 MG capsule Take 60 mg by mouth once daily., Historical Med      gabapentin (NEURONTIN) 600 MG tablet Take 600 mg by mouth 3 (three) times daily., Historical Med      hydroCHLOROthiazide (HYDRODIURIL) 50 MG tablet Take 25 mg by mouth once daily., Starting u 7/28/2022, Historical Med      losartan (COZAAR) 50 MG tablet Take 50 mg by mouth once daily., Historical Med      omega-3 fatty acids/fish oil (FISH OIL-OMEGA-3 FATTY ACIDS) 300-1,000 mg capsule Take 1 capsule by mouth once daily., Historical Med      omeprazole (PRILOSEC) 20 MG capsule Take 20 mg by mouth once daily., Historical Med      oxyCODONE-acetaminophen (PERCOCET) 5-325 mg per tablet Take 1 tablet by mouth every 4 to 6 hours as needed for Pain., Starting Mon 5/20/2024, Print      oxyCODONE-acetaminophen (PERCOCET) 7.5-325 mg per tablet Take 1 tablet by mouth every 4 (four) hours as needed for Pain., Historical Med      potassium chloride (KLOR-CON) 10 MEQ TbSR TAKE ONE TABLET BY MOUTH TWICE A DAY TO INCREASE POTASSIUM,  Historical Med      rOPINIRole (REQUIP) 0.25 MG tablet Take 0.25 mg by mouth 3 (three) times daily., Historical Med      semaglutide (OZEMPIC) 0.25 mg or 0.5 mg (2 mg/3 mL) pen injector Inject 0.5 mg into the skin every 7 days., Historical Med      temazepam (RESTORIL) 7.5 MG Cap Take 30 mg by mouth nightly as needed., Historical Med      traMADoL (ULTRAM-ER) 100 MG Tb24 Take 100 mg by mouth 3 (three) times daily., Historical Med      traZODone (DESYREL) 100 MG tablet Take 100 mg by mouth every evening., Starting Mon 11/21/2022, Historical Med             Discharge Procedure Orders (must include Diet, Follow-up, Activity):   Discharge Procedure Orders (must include Diet, Follow-up, Activity)   SLING FOR HOME USE      Remove dressing in 72 hours     Ice to affected area     Lifting restrictions   Order Comments: Sling for comfort     Activity as tolerated        Follow Up:  Follow up as scheduled.  Resume routine diet.  Activity as tolerated.    No driving day of procedure.

## 2024-06-05 ENCOUNTER — OFFICE VISIT (OUTPATIENT)
Dept: ORTHOPEDICS | Facility: CLINIC | Age: 64
End: 2024-06-05
Payer: OTHER GOVERNMENT

## 2024-06-05 VITALS — HEIGHT: 66 IN | BODY MASS INDEX: 27.81 KG/M2 | WEIGHT: 173.06 LBS

## 2024-06-05 DIAGNOSIS — M19.011 PRIMARY OSTEOARTHRITIS OF RIGHT SHOULDER: Primary | ICD-10-CM

## 2024-06-05 DIAGNOSIS — M75.121 COMPLETE TEAR OF RIGHT ROTATOR CUFF, UNSPECIFIED WHETHER TRAUMATIC: ICD-10-CM

## 2024-06-05 PROCEDURE — 99999 PR PBB SHADOW E&M-EST. PATIENT-LVL III: CPT | Mod: PBBFAC,,, | Performed by: ORTHOPAEDIC SURGERY

## 2024-06-05 PROCEDURE — 99213 OFFICE O/P EST LOW 20 MIN: CPT | Mod: PBBFAC,PO | Performed by: ORTHOPAEDIC SURGERY

## 2024-06-05 PROCEDURE — 99024 POSTOP FOLLOW-UP VISIT: CPT | Mod: ,,, | Performed by: ORTHOPAEDIC SURGERY

## 2024-06-05 NOTE — PROGRESS NOTES
2 weeks out from shoulder arthroscopy with decompression as well as debridement of the shoulder joint where she had areas of grade 4 chondrosis     Sutures removed no signs infection     Plan: Continue with home exercise program, follow-up in several weeks time   ERD of oncotype test 2/10/2021  Writer contacted patient to advise and schedule MDFU to discuss oncotype results for Thursday 2/10 in the afternoon - if results not available will contact patient prior to appointment and reschedule for Monday 2/14    No answer  LVM for patient to return call to [facility].    Writer forwarded this note to Dr. Mo radiation oncology of above ERD

## 2024-06-21 ENCOUNTER — PATIENT MESSAGE (OUTPATIENT)
Dept: ORTHOPEDICS | Facility: CLINIC | Age: 64
End: 2024-06-21
Payer: OTHER GOVERNMENT

## 2024-06-24 DIAGNOSIS — Z98.890 S/P ARTHROSCOPY OF RIGHT SHOULDER: ICD-10-CM

## 2024-06-24 DIAGNOSIS — M25.511 RIGHT SHOULDER PAIN: Primary | ICD-10-CM

## 2024-07-08 ENCOUNTER — CLINICAL SUPPORT (OUTPATIENT)
Dept: REHABILITATION | Facility: HOSPITAL | Age: 64
End: 2024-07-08
Attending: ORTHOPAEDIC SURGERY
Payer: OTHER GOVERNMENT

## 2024-07-08 DIAGNOSIS — M25.511 CHRONIC RIGHT SHOULDER PAIN: Primary | ICD-10-CM

## 2024-07-08 DIAGNOSIS — Z98.890 S/P ARTHROSCOPY OF RIGHT SHOULDER: ICD-10-CM

## 2024-07-08 DIAGNOSIS — R29.898 SHOULDER WEAKNESS: ICD-10-CM

## 2024-07-08 DIAGNOSIS — M25.611 DECREASED SHOULDER MOBILITY, RIGHT: ICD-10-CM

## 2024-07-08 DIAGNOSIS — G89.29 CHRONIC RIGHT SHOULDER PAIN: Primary | ICD-10-CM

## 2024-07-08 DIAGNOSIS — M25.511 RIGHT SHOULDER PAIN: ICD-10-CM

## 2024-07-08 PROCEDURE — 97112 NEUROMUSCULAR REEDUCATION: CPT | Mod: PO | Performed by: PHYSICAL THERAPIST

## 2024-07-08 PROCEDURE — 97161 PT EVAL LOW COMPLEX 20 MIN: CPT | Mod: PO | Performed by: PHYSICAL THERAPIST

## 2024-07-08 NOTE — PLAN OF CARE
[facility]   Physical Therapy Initial Evaluation      Name: Bria Nathan  Clinic Number: [id number]    Therapy Diagnosis:   Encounter Diagnoses   Name Primary?    Right shoulder pain     S/P arthroscopy of right shoulder     Chronic right shoulder pain Yes    Shoulder weakness     Decreased shoulder mobility, right         Physician: Simone Alfredo MD    Physician Orders: PT Eval and Treat   Medical Diagnosis from Referral:   Right shoulder pain   S/P arthroscopy of right shoulder  Evaluation Date: 7/8/2024  Authorization Period Expiration: 11/17/2024  Plan of Care Expiration: 09/07/2024  Progress Note Due: 08/08/2024  Visit # / Visits authorized: 1   FOTO: 1/3    Precautions: Standard     Time In: 1700  Time Out: 1800  Total Appointment Time (timed & untimed codes): 60 minutes    Subjective     Date of onset: 05/21/2024    History of current condition - Bria reports: having right shoulder pain for 11+ months that came on gradually which led to her right shoulder scope from 05/21/2024.  Right shoulder arthroscopy with debridement of shoulder joint, subacromial decompression with acromioplasty, distal clavicle excision. Patient currently reported no therapy for 6 weeks, now out of the sling and has been trying to use the arm with weakness noted. Right shoulder pain, intermittent noted.    Falls: none    Imaging:: none since sx    Prior Therapy: none  Social History:  lives with their spouse  Occupation: Full time /Management  Prior Level of Function: independent  Current Level of Function: modified independent, increase time noted with home management.    Pain:  Current 6/10, worst 8/10, best 2/10   Location: right shoulder   Description: Aching, Dull, Tight, and Variable  Aggravating Factors: any movement at this time, especially reaching  Easing Factors: pain medication    Patients goals: To get all of her motion back, increase strength to the  right arm, decrease pain.     Medical History:   Past Medical History:   Diagnosis Date    Arthritis     Hypertension     Insomnia     PONV (postoperative nausea and vomiting)     Restless leg syndrome        Surgical History:   Bria Nathan  has a past surgical history that includes ACDF (2008); Spinal cord stimulator implant; Radiofrequency thermal coagulation of medial branch of posterior ramus of cervical spinal nerve (Left, 3/11/2021); Appendectomy; Colonoscopy (2011); Upper gastrointestinal endoscopy (2011); Hysterectomy; Esophagogastroduodenoscopy (N/A, 11/12/2021); Colonoscopy (N/A, 11/12/2021); Injection of anesthetic agent around medial branch nerves innervating lumbar facet joint (Bilateral, 3/31/2023); Injection of anesthetic agent around medial branch nerves innervating lumbar facet joint (Bilateral, 5/5/2023); Radiofrequency ablation of lumbar medial branch nerve at single level (Bilateral, 6/20/2023); Replacement of nerve stimulator battery (N/A, 10/20/2023); Arthroscopy of shoulder with decompression of subacromial space (Right, 5/21/2024); Arthroscopic debridement of shoulder (Right, 5/21/2024); and Distal clavicle excision (Right, 5/21/2024).    Medications:   Bria has a current medication list which includes the following prescription(s): amlodipine, atorvastatin, cholecalciferol (vitamin d3), duloxetine, gabapentin, hydrochlorothiazide, losartan, fish oil-omega-3 fatty acids, omeprazole, oxycodone-acetaminophen, oxycodone-acetaminophen, potassium chloride, ropinirole, ozempic, temazepam, tramadol, and trazodone, and the following Facility-Administered Medications: diphenhydramine, electrolyte-s (ph 7.4), fentanyl, hydromorphone (pf), lactated ringers, lidocaine (pf) 10 mg/ml (1%), and oxycodone.    Allergies:   Review of patient's allergies indicates:  No Known Allergies     Objective      Observation: right shoulder splinting noted    Posture: FHP, rounded shoulders     A/P  Range of Motion:   Shoulder Left Right   Flexion 155 110/130   Abduction 160 90   ER at 0 45 35   ER at 90 80 60   IR 60 58       Right shoulder shrug noted with flexion      Upper Extremity Strength   (L) UE (R) UE   Shoulder flexion: 5/5 4-/5   Shoulder Abduction: 5/5 4-/5   Shoulder ER 5/5 3+/5   Shoulder IR 5/5 4-/5   Lower Trap     Middle Trap     Rhomboids         Special Tests: Right shoulder  -scapular dyskinesia noted    Flip sign: (medial border winging). + right shoulder    Joint Mobility:hypomobile right shoulder (GHJ) inferior glide    Palpation: point tenderness, increased sensitivity with right shoulder anterior/lateral/posteruir GHJ    Sensation: intact to light touch to right shoulder    Flexibility: pec-minor       Primary Measure Score Range Intake Score Score Interpretation  DASH 100 - 0 55.0 Higher Score = Greater Disability    Treatment     Total Treatment time (time-based codes) separate from Evaluation: 8 minutes     Bria received the treatments listed below:      neuromuscular re-education activities to improve: Balance, Kinesthetic, Sense, Posture, and strengthening for 8 minutes. The following activities were included:  Supine: AAROM: flexion with dowel  Table fwd slides  Scapular retractions  Right shoulder isometrics: flexion, extension, ER/IR    Patient Education and Home Exercises     Education provided:   - Yes    Written Home Exercises Provided: yes. Exercises were reviewed and Bria was able to demonstrate them prior to the end of the session.  Bria demonstrated good  understanding of the education provided. See EMR under Patient Instructions for exercises provided during therapy sessions.    Assessment     Bria is a 63 y.o. female referred to outpatient Physical Therapy with a medical diagnosis of Right shoulder pain  S/P arthroscopy of right shoulder. Patient presents with right shoulder pain, decreased active shoulder mobility, shoulder weakness.    Problem  List: pain, decreased ROM, decreased flexibility, decreased strength, decreased balance and stability, decreased motor control, inability to participate fully in vocation pursuits, and decreased ability to fully participate in recreational/sports related activities.    Patient prognosis is Good.   Patient will benefit from skilled outpatient Physical Therapy to address the deficits stated above and in the chart below, provide patient /family education, and to maximize patientt's level of independence.     Plan of care discussed with patient: Yes  Patient's spiritual, cultural and educational needs considered and patient is agreeable to the plan of care and goals as stated below:     Anticipated Barriers for therapy: none    Medical Necessity is demonstrated by the following  History  Co-morbidities and personal factors that may impact the plan of care Co-morbidities:   advanced age and HTN    Personal Factors:   no deficits     moderate   Examination  Body Structures and Functions, activity limitations and participation restrictions that may impact the plan of care Body Regions:   upper extremities    Body Systems:    ROM  strength  transitions  motor control    Participation Restrictions:   Home management  Work related tasks    Activity limitations:   Learning and applying knowledge  no deficits    General Tasks and Commands  no deficits    Communication  no deficits    Mobility  lifting and carrying objects    Self care  no deficits    Domestic Life  doing house work (cleaning house, washing dishes, laundry)    Interactions/Relationships  no deficits    Life Areas  no deficits    Community and Social Life  no deficits         high   Clinical Presentation stable and uncomplicated low   Decision Making/ Complexity Score: low     Goals:  Short Term Goals: 4 weeks   Independent with with HEP with < 5/10 pain.  Improve right shoulder/scapular MMT 1/2 grade for home management tasks.  Improve right shoulder flexion  actively > 100 degrees for reaching purposes.  Improve right shoulder ER @ 90 degrees abduction > 60 degrees for UE tasks.    Long Term Goals: 8 weeks   Independent with HEP with no painful limitations.  Demonstrate right shoulder/scapular MMT > 4/5 for home management.  Demonstrate right shoulder flexion/abduction > 130 degrees actively with no painful limitations.  Report no difficulty with blow drying hair, making bed, and reaching above the head for items on the shelf.      Plan     Plan of care Certification: 7/8/2024 to 09/08/2024.    Outpatient Physical Therapy 2 times weekly for 8 weeks to include the following interventions: Manual Therapy, Moist Heat/ Ice, Neuromuscular Re-ed, Patient Education, Therapeutic Activities, and Therapeutic Exercise.

## 2024-07-08 NOTE — PATIENT INSTRUCTIONS
HOME EXERCISE PROGRAM  Created by Mikael Nesbitt  Jul 8th, 2024  View videos at [email or web address]        SCAPULAR RETRACTIONS    Move your shoulder blades back and down. Hold, relax and repeat. Repeat 10 Times   Hold 2 Seconds   Complete 2 Sets   Perform 3 Times a Day          WAND FLEXION - SUPINE    Lying on your back and holding a wand or cane, slowly raise the wand towards overhead. Use your unaffected arm to assist with the movement. Repeat 10 Times   Hold 1 Second   Complete 1 Set   Perform 2 Times a Day          TABLE SLIDE - FLEXION    Sit in a chair and rest your injured arm on a table. Start by leaning forward towards the table as you slide your arm forward as shown. Then, return to starting position and repeat.    Video # [id number] Repeat 10 Times   Hold 2 Seconds   Complete 1 Set   Perform 2 Times a Day          SHOULDER - ISOMETRIC FLEXION    Place a small folded towel between your hand and a wall. Gently push your fist forward into a wall with your elbow bent. Hold, relax and repeat.    Video # [id number] Repeat 10 Times   Hold 2 Seconds   Complete 1 Set   Perform 2 Times a Day          SHOULDER - ISOMETRIC EXTENSION    Place a small folded towel between your elbow and a wall behind you. Gently press your elbow back against the wall/towel. Hold, relax and repeat.    Video # [id number] Repeat 10 Times   Hold 2 Seconds   Complete 1 Set   Perform 2 Times a Day          SHOULDER - ISOMETRIC INTERNAL ROTATION    Place a small folded towel between your hand and a wall. Gently press the palm side of your hand towards a wall. Maintain a bent elbow the entire time. Hold, relax and repeat.    Video # [id number] Repeat 10 Times   Hold 2 Seconds   Complete 1 Set   Perform 2 Times a Day          SHOULDER - ISOMETRIC EXTERNAL ROTATION    Gently press your hand into a wall using the back side of your hand. Maintain a bent elbow the entire time.    Video # [id number] Repeat 10 Times   Hold 3 Seconds   Complete 2 Sets    Perform 2 Times a Day

## 2025-01-03 ENCOUNTER — HOSPITAL ENCOUNTER (OUTPATIENT)
Dept: RADIOLOGY | Facility: HOSPITAL | Age: 65
Discharge: HOME | End: 2025-01-03
Attending: ORTHOPAEDIC SURGERY
Payer: OTHER GOVERNMENT

## 2025-01-03 ENCOUNTER — OFFICE VISIT (OUTPATIENT)
Dept: ORTHOPEDICS | Facility: CLINIC | Age: 65
End: 2025-01-03
Payer: OTHER GOVERNMENT

## 2025-01-03 DIAGNOSIS — M25.511 RIGHT SHOULDER PAIN, UNSPECIFIED CHRONICITY: ICD-10-CM

## 2025-01-03 DIAGNOSIS — M25.511 RIGHT SHOULDER PAIN, UNSPECIFIED CHRONICITY: Primary | ICD-10-CM

## 2025-01-03 DIAGNOSIS — S42.201A CLOSED FRACTURE OF PROXIMAL END OF RIGHT HUMERUS, UNSPECIFIED FRACTURE MORPHOLOGY, INITIAL ENCOUNTER: ICD-10-CM

## 2025-01-03 PROCEDURE — 73060 X-RAY EXAM OF HUMERUS: CPT | Mod: TC,PO,RT

## 2025-01-03 PROCEDURE — 73030 X-RAY EXAM OF SHOULDER: CPT | Mod: TC,PO,RT

## 2025-01-03 PROCEDURE — 73060 X-RAY EXAM OF HUMERUS: CPT | Mod: 26,RT,, | Performed by: RADIOLOGY

## 2025-01-03 PROCEDURE — 99999 PR PBB SHADOW E&M-EST. PATIENT-LVL III: CPT | Mod: PBBFAC,,, | Performed by: ORTHOPAEDIC SURGERY

## 2025-01-03 PROCEDURE — 99213 OFFICE O/P EST LOW 20 MIN: CPT | Mod: PBBFAC,25,PO | Performed by: ORTHOPAEDIC SURGERY

## 2025-01-03 PROCEDURE — 73030 X-RAY EXAM OF SHOULDER: CPT | Mod: 26,RT,, | Performed by: RADIOLOGY

## 2025-01-03 RX ORDER — OXYCODONE AND ACETAMINOPHEN 5; 325 MG/1; MG/1
1 TABLET ORAL EVERY 4 HOURS PRN
Qty: 27 EACH | Refills: 0 | Status: SHIPPED | OUTPATIENT
Start: 2025-01-03

## 2025-01-03 NOTE — PROGRESS NOTES
64 years old a fall onto her right shoulder couple weeks ago been painful since then medially in a sling.  This is the same shoulder we had operated on arthrosis shoulder arthroscopy     Exam shows ecchymoses and tenderness the proximal humerus hand is functioning well no deficits     X-rays show proximal humerus fracture with acceptable position     Assessment: Right proximal humerus fracture     Plan: Continue sling, implement gentle range of motion, follow-up in about 5 weeks time as a postop visit with x-rays of her right humerus.  Patient did request oral pain medication which we will give to her and a 1 time basis

## 2025-01-27 ENCOUNTER — PATIENT MESSAGE (OUTPATIENT)
Dept: ORTHOPEDICS | Facility: CLINIC | Age: 65
End: 2025-01-27
Payer: OTHER GOVERNMENT

## 2025-01-28 DIAGNOSIS — S42.201A CLOSED FRACTURE OF PROXIMAL END OF RIGHT HUMERUS, UNSPECIFIED FRACTURE MORPHOLOGY, INITIAL ENCOUNTER: Primary | ICD-10-CM

## 2025-01-30 ENCOUNTER — OFFICE VISIT (OUTPATIENT)
Dept: ORTHOPEDICS | Facility: CLINIC | Age: 65
End: 2025-01-30
Payer: OTHER GOVERNMENT

## 2025-01-30 ENCOUNTER — HOSPITAL ENCOUNTER (OUTPATIENT)
Dept: RADIOLOGY | Facility: HOSPITAL | Age: 65
Discharge: HOME | End: 2025-01-30
Attending: ORTHOPAEDIC SURGERY
Payer: OTHER GOVERNMENT

## 2025-01-30 DIAGNOSIS — S42.201A CLOSED FRACTURE OF PROXIMAL END OF RIGHT HUMERUS, UNSPECIFIED FRACTURE MORPHOLOGY, INITIAL ENCOUNTER: ICD-10-CM

## 2025-01-30 DIAGNOSIS — Z87.81 HISTORY OF RIGHT SHOULDER FRACTURE: Primary | ICD-10-CM

## 2025-01-30 PROCEDURE — 99999 PR PBB SHADOW E&M-EST. PATIENT-LVL III: CPT | Mod: PBBFAC,,, | Performed by: ORTHOPAEDIC SURGERY

## 2025-01-30 PROCEDURE — 73060 X-RAY EXAM OF HUMERUS: CPT | Mod: 26,RT,, | Performed by: RADIOLOGY

## 2025-01-30 PROCEDURE — 73060 X-RAY EXAM OF HUMERUS: CPT | Mod: TC,PO,RT

## 2025-01-30 PROCEDURE — 99213 OFFICE O/P EST LOW 20 MIN: CPT | Mod: PBBFAC,25,PO | Performed by: ORTHOPAEDIC SURGERY

## 2025-01-30 NOTE — PROGRESS NOTES
Six weeks out from proximal humerus fracture.  Patient states that she had a coughing episode and felt that her shoulder popped out.  Rates pain is up to 7 on the pain scale     Exam shows some tenderness the proximal humerus, internal external rotation passively with no blocks, she is able to abduct but with some discomfort     X-rays show maintenance of position with healing identified     Assessment: Healing proximal humerus fracture    Plan: Continue with sling intermittently, increase range of motion and activities to tolerance, follow-up in about a month's time as a postop visit with x-rays of her right humerus

## 2025-02-26 DIAGNOSIS — S42.201A CLOSED FRACTURE OF PROXIMAL END OF RIGHT HUMERUS, UNSPECIFIED FRACTURE MORPHOLOGY, INITIAL ENCOUNTER: Primary | ICD-10-CM

## 2025-02-27 ENCOUNTER — HOSPITAL ENCOUNTER (OUTPATIENT)
Dept: RADIOLOGY | Facility: HOSPITAL | Age: 65
Discharge: HOME | End: 2025-02-27
Attending: ORTHOPAEDIC SURGERY
Payer: OTHER GOVERNMENT

## 2025-02-27 ENCOUNTER — OFFICE VISIT (OUTPATIENT)
Dept: ORTHOPEDICS | Facility: CLINIC | Age: 65
End: 2025-02-27
Payer: OTHER GOVERNMENT

## 2025-02-27 VITALS — HEIGHT: 66 IN | BODY MASS INDEX: 27.81 KG/M2 | WEIGHT: 173.06 LBS

## 2025-02-27 DIAGNOSIS — S42.201A CLOSED FRACTURE OF PROXIMAL END OF RIGHT HUMERUS, UNSPECIFIED FRACTURE MORPHOLOGY, INITIAL ENCOUNTER: Primary | ICD-10-CM

## 2025-02-27 DIAGNOSIS — S42.201A CLOSED FRACTURE OF PROXIMAL END OF RIGHT HUMERUS, UNSPECIFIED FRACTURE MORPHOLOGY, INITIAL ENCOUNTER: ICD-10-CM

## 2025-02-27 PROCEDURE — 99999 PR PBB SHADOW E&M-EST. PATIENT-LVL III: CPT | Mod: PBBFAC,,, | Performed by: ORTHOPAEDIC SURGERY

## 2025-02-27 PROCEDURE — 73060 X-RAY EXAM OF HUMERUS: CPT | Mod: TC,PO,RT

## 2025-02-27 PROCEDURE — 73060 X-RAY EXAM OF HUMERUS: CPT | Mod: 26,RT,, | Performed by: STUDENT IN AN ORGANIZED HEALTH CARE EDUCATION/TRAINING PROGRAM

## 2025-02-27 PROCEDURE — 99213 OFFICE O/P EST LOW 20 MIN: CPT | Mod: PBBFAC,25,PO | Performed by: ORTHOPAEDIC SURGERY

## 2025-02-27 PROCEDURE — 99024 POSTOP FOLLOW-UP VISIT: CPT | Mod: ,,, | Performed by: ORTHOPAEDIC SURGERY

## 2025-02-27 NOTE — PROGRESS NOTES
2-1/2 months out from proximal humerus fracture feeling better     Exam shows nontender the fracture site relatively well-preserved motion of the shoulder     X-rays show healing     Assessment: Healing proximal humerus fracture     Plan: Continue with progressive range of motion strengthening exercises, she wants to do this on her own rather with physical therapy, follow up as needed

## (undated) DEVICE — TRAY NERVE BLOCK

## (undated) DEVICE — BLADE GATOR 4.2

## (undated) DEVICE — ABLATOR EXTENDED LENGTH

## (undated) DEVICE — DRAPE STERI U-SHAPED 47X51IN

## (undated) DEVICE — APPLICATOR CHLORAPREP CLR 10.5

## (undated) DEVICE — HANDLE SURG LIGHT NONRIGID

## (undated) DEVICE — BLADE SURG CARBON STEEL SZ11

## (undated) DEVICE — PROBE ARTHO ENERGY 90 DEG

## (undated) DEVICE — SPONGE COTTON TRAY 4X4IN

## (undated) DEVICE — MAT QUICK 40X30 FLOOR FLUID LF

## (undated) DEVICE — SOCKINETTE IMPERVIOUS 12X48IN

## (undated) DEVICE — GLOVE SURGICAL LATEX SZ 7

## (undated) DEVICE — BNDG COFLEX FOAM LF2 ST 6X5YD

## (undated) DEVICE — SUT 2/0 36IN ETHIBOND EXCE

## (undated) DEVICE — DRAPE STERI-DRAPE 1000 17X11IN

## (undated) DEVICE — PAD ABDOMINAL STERILE 8X10IN

## (undated) DEVICE — DRAPE INCISE IOBAN 2 23X17IN

## (undated) DEVICE — Device

## (undated) DEVICE — SEE MEDLINE ITEM 157161

## (undated) DEVICE — SUT MONOCYRL 4-0 PS2 UND

## (undated) DEVICE — BLADE SHAVER 4.5 6/BX

## (undated) DEVICE — NDL 18GA X1 1/2 REG BEVEL

## (undated) DEVICE — SUT PROLENE 0 CT1 30IN BLUE

## (undated) DEVICE — GLOVE BIOGEL ORTHOPEDIC 7.5

## (undated) DEVICE — DRAPE THREE-QTR REINF 53X77IN

## (undated) DEVICE — SOL STRL WATER INJ 1000ML BG

## (undated) DEVICE — SOL IRR NACL .9% 3000ML

## (undated) DEVICE — SEE MEDLINE ITEM 154981

## (undated) DEVICE — ELECTRODE REM PLYHSV RETURN 9

## (undated) DEVICE — PENCIL ROCKER SWITCH 10FT CORD

## (undated) DEVICE — TOWEL OR DISP STRL BLUE 4/PK

## (undated) DEVICE — NEPTUNE 4 PORT MANIFOLD

## (undated) DEVICE — CLOSURE SKIN STERI STRIP 1/4X3

## (undated) DEVICE — SUT ETHILON 3-0 FS-1 30

## (undated) DEVICE — PACK SHOULDER DRAPE ECLIPSE

## (undated) DEVICE — SUT 0 VICRYL / CT-1

## (undated) DEVICE — YANKAUER OPEN TIP W/O VENT

## (undated) DEVICE — UNDERGLOVES BIOGEL PI SIZE 8

## (undated) DEVICE — CANNULA SHOULDER 10/BOX

## (undated) DEVICE — SYR 10CC LUER LOCK

## (undated) DEVICE — PATIENT CONTROLLER

## (undated) DEVICE — SLING ORTHOPEDIC LARGE

## (undated) DEVICE — UNDERGLOVES BIOGEL PI SIZE 7.5

## (undated) DEVICE — GLOVE SENSICARE PI GRN 8

## (undated) DEVICE — TUBING SUC UNIV W/CONN 12FT

## (undated) DEVICE — DURAPREP SURG SCRUB 26ML

## (undated) DEVICE — DRAPE LAP T SHT W/ INSTR PAD

## (undated) DEVICE — STAPLER SKIN ROTATING HEAD

## (undated) DEVICE — CLEANER TIP ELECSURG 2X2IN

## (undated) DEVICE — SUT 2/0 36IN COATED VICRYL

## (undated) DEVICE — DRAPE HALF SURGICAL 40X58IN

## (undated) DEVICE — MARKER SKIN STND TIP BLUE BARR

## (undated) DEVICE — BNDG COFLEX FOAM LF2 ST 4X5YD

## (undated) DEVICE — SYRINGE 30CC LL W/O NDL

## (undated) DEVICE — STRAP OR TABLE 5IN X 72IN

## (undated) DEVICE — PATIENT MAGNET

## (undated) DEVICE — ADHESIVE MASTISOL VIAL 48/BX

## (undated) DEVICE — LINER GLOVE POWDERFREE SZ 7.5

## (undated) DEVICE — CHARGER

## (undated) DEVICE — DRESSING XEROFORM NONADH 1X8IN

## (undated) DEVICE — TUBE SET INFLOW/OUTFLOW

## (undated) DEVICE — PAD ELECTROSURGICAL PAT PLATE

## (undated) DEVICE — MAT EVAC SURGISAFE 36 X 48

## (undated) DEVICE — NDL HYPO REG 25G X 1 1/2

## (undated) DEVICE — DRESSING MEPORE ISLAND 31/2X4

## (undated) DEVICE — SEE MEDLINE ITEM 152622

## (undated) DEVICE — REMOVER LOTION

## (undated) DEVICE — NDL SPINAL 18GX3.5 SPINOCAN

## (undated) DEVICE — PAD K-THERMIA 24IN X 60IN